# Patient Record
Sex: FEMALE | Race: WHITE | Employment: FULL TIME | URBAN - NONMETROPOLITAN AREA
[De-identification: names, ages, dates, MRNs, and addresses within clinical notes are randomized per-mention and may not be internally consistent; named-entity substitution may affect disease eponyms.]

---

## 2019-06-02 ENCOUNTER — HOSPITAL ENCOUNTER (EMERGENCY)
Age: 39
Discharge: HOME | End: 2019-06-02
Attending: FAMILY MEDICINE
Payer: COMMERCIAL

## 2019-06-02 VITALS
DIASTOLIC BLOOD PRESSURE: 78 MMHG | SYSTOLIC BLOOD PRESSURE: 160 MMHG | WEIGHT: 230.8 LBS | TEMPERATURE: 97.9 F | HEART RATE: 98 BPM | RESPIRATION RATE: 18 BRPM | BODY MASS INDEX: 36.22 KG/M2 | HEIGHT: 67 IN

## 2019-06-02 DIAGNOSIS — R42 DIZZINESS: Primary | ICD-10-CM

## 2019-06-02 PROCEDURE — 6370000000 HC RX 637 (ALT 250 FOR IP): Performed by: FAMILY MEDICINE

## 2019-06-02 PROCEDURE — 99283 EMERGENCY DEPT VISIT LOW MDM: CPT

## 2019-06-02 RX ORDER — MECLIZINE HYDROCHLORIDE 25 MG/1
25 TABLET ORAL 3 TIMES DAILY PRN
Qty: 30 TABLET | Refills: 0 | Status: SHIPPED | OUTPATIENT
Start: 2019-06-02 | End: 2019-06-12

## 2019-06-02 RX ORDER — MECLIZINE HCL 12.5 MG/1
25 TABLET ORAL ONCE
Status: COMPLETED | OUTPATIENT
Start: 2019-06-02 | End: 2019-06-02

## 2019-06-02 RX ADMIN — MECLIZINE 25 MG: 12.5 TABLET ORAL at 20:48

## 2019-06-03 NOTE — ED PROVIDER NOTES
eMERGENCY dEPARTMENT eNCOUnter        [facility]    Chief Complaint   Patient presents with    Dizziness     dizziness at work today and thought she was going to pass out. They checked her BG and it was >100. Dizziness comes and goes. Finds it happens with movement. HPI    Adia Nance is a 35 y.o. female who presents with dizziness at work today she thought she was going to pass out. Her blood sugars okay. The dizziness is been with her for 3-4 days. It happens with head movement. She did do [place] week\" in [place] with recent significant partying. She thinks this may relate. No persistent vomiting. REVIEW OF SYSTEMS    See HPI for further details. Review of systems otherwise negative. PAST MEDICAL HISTORY    History reviewed. No pertinent past medical history. SURGICAL HISTORY    Past Surgical History:   Procedure Laterality Date     SECTION         CURRENT MEDICATIONS        ALLERGIES    No Known Allergies    FAMILY HISTORY    History reviewed. No pertinent family history. SOCIAL HISTORY    Social History     Socioeconomic History    Marital status: Single     Spouse name: None    Number of children: None    Years of education: None    Highest education level: None   Occupational History    None   Social Needs    Financial resource strain: None    Food insecurity:     Worry: None     Inability: None    Transportation needs:     Medical: None     Non-medical: None   Tobacco Use    Smoking status: Current Every Day Smoker     Packs/day: 0.50     Types: Cigarettes    Smokeless tobacco: Never Used   Substance and Sexual Activity    Alcohol use:  Yes     Alcohol/week: 1.2 oz     Types: 2 Cans of beer per week    Drug use: Never    Sexual activity: None   Lifestyle    Physical activity:     Days per week: None     Minutes per session: None    Stress: None   Relationships    Social connections:     Talks on phone: None     Gets together: None     Attends Yazidism service: None     Active member of club or organization: None     Attends meetings of clubs or organizations: None     Relationship status: None    Intimate partner violence:     Fear of current or ex partner: None     Emotionally abused: None     Physically abused: None     Forced sexual activity: None   Other Topics Concern    None   Social History Narrative    None       PHYSICAL EXAM    VITAL SIGNS: Pulse 98   Temp 97.9 °F (36.6 °C) (Oral)   Resp 18   Ht 5' 7\" (1.702 m)   Wt 230 lb 12.8 oz (104.7 kg)   BMI 36.15 kg/m²    Constitutional:  Well developed, well nourished, 26-year-old female who feels dizzy with rapid head movement, no acute distress   HENT:  Atraumatic, external ears normal, nose normal, oropharynx moist, significant dental repair, tongue and uvula normal size with normal movement. Neck- supple with trachea midline. No thyroid tenderness. No JVD noted. Eyes show EOMI, PERRLA. Pupils are equal and reactive to light at 3 mm. Good red reflex bilaterally. The patient was contacts. No significant nystagmus. Respiratory: Clear lung sounds with no wheezes or rhonchi. Cardiovascular:  Regular rate and rhythm at 96/m. The PMI is in the left. No murmur or gallop rhythm is noted. GI:  Soft, nondistended, normal bowel sounds, nontender, no organomegaly, no mass, no rebound, no guarding   Musculoskeletal:  No edema, no tenderness, no deformities. Back- no tenderness. The neck shows full range of motion without restriction. Integument:  Well hydrated, no rash   Neurologic:  Alert & oriented x 3, no focal deficits noted, cranial nerves II through XII appear to be grossly intact. Midline stable with maneuvers    ED COURSE & MEDICAL DECISION MAKING    Pertinent Labs & Imaging studies reviewed. (See chart for details)    Summation      Patient Course: 35-year-old female with dizziness that started 2 days ago and was worse at work tonight.   The patient has positional type vertigo and is treated with meclizine. Exam done. History reviewed. Patient stable at discharge    ED Medications administered this visit:    Medications   meclizine (ANTIVERT) tablet 25 mg (25 mg Oral Given 6/2/19 2048)       New Prescriptions from this visit:    New Prescriptions    MECLIZINE (ANTIVERT) 25 MG TABLET    Take 1 tablet by mouth 3 times daily as needed for Dizziness       Follow-up:  Return to ER if condition worsens     Final Impression:   1.  Dizziness New Problem              (Please note that portions of this note were completed with a voice recognition program.  Efforts were made to edit the dictations but occasionally words are mis-transcribed.)       Amnada Alvarez,   06/04/19 [id number]

## 2021-01-17 ENCOUNTER — APPOINTMENT (OUTPATIENT)
Dept: GENERAL RADIOLOGY | Age: 41
DRG: 481 | End: 2021-01-17
Payer: COMMERCIAL

## 2021-01-17 ENCOUNTER — HOSPITAL ENCOUNTER (INPATIENT)
Age: 41
LOS: 3 days | Discharge: NURSING, REHAB OR LONG-TERM CARE | DRG: 481 | End: 2021-01-20
Attending: FAMILY MEDICINE | Admitting: INTERNAL MEDICINE
Payer: COMMERCIAL

## 2021-01-17 ENCOUNTER — ANESTHESIA EVENT (OUTPATIENT)
Dept: OPERATING ROOM | Age: 41
DRG: 481 | End: 2021-01-17
Payer: COMMERCIAL

## 2021-01-17 ENCOUNTER — ANESTHESIA (OUTPATIENT)
Dept: OPERATING ROOM | Age: 41
DRG: 481 | End: 2021-01-17
Payer: COMMERCIAL

## 2021-01-17 VITALS
SYSTOLIC BLOOD PRESSURE: 116 MMHG | RESPIRATION RATE: 17 BRPM | DIASTOLIC BLOOD PRESSURE: 78 MMHG | OXYGEN SATURATION: 96 %

## 2021-01-17 DIAGNOSIS — S72.001A CLOSED FRACTURE OF RIGHT HIP, INITIAL ENCOUNTER (HCC): Primary | ICD-10-CM

## 2021-01-17 DIAGNOSIS — D50.9 MICROCYTIC ANEMIA: ICD-10-CM

## 2021-01-17 DIAGNOSIS — F10.920 ACUTE ALCOHOLIC INTOXICATION WITHOUT COMPLICATION (HCC): ICD-10-CM

## 2021-01-17 LAB
ABO/RH: NORMAL
ABSOLUTE EOS #: 0 K/UL (ref 0–0.4)
ABSOLUTE IMMATURE GRANULOCYTE: ABNORMAL K/UL (ref 0–0.3)
ABSOLUTE LYMPH #: 1.6 K/UL (ref 1–4.8)
ABSOLUTE MONO #: 0.7 K/UL (ref 0–1)
ANION GAP SERPL CALCULATED.3IONS-SCNC: 13 MMOL/L (ref 9–17)
ANTIBODY SCREEN: NEGATIVE
ARM BAND NUMBER: NORMAL
BASOPHILS # BLD: 0 % (ref 0–2)
BASOPHILS ABSOLUTE: 0 K/UL (ref 0–0.2)
BILIRUBIN URINE: NEGATIVE
BLD PROD TYP BPU: NORMAL
BLD PROD TYP BPU: NORMAL
BUN BLDV-MCNC: 10 MG/DL (ref 6–20)
BUN/CREAT BLD: 14 (ref 9–20)
CALCIUM SERPL-MCNC: 9.1 MG/DL (ref 8.6–10.4)
CHLORIDE BLD-SCNC: 103 MMOL/L (ref 98–107)
CO2: 21 MMOL/L (ref 20–31)
COLOR: YELLOW
COMMENT UA: NORMAL
CREAT SERPL-MCNC: 0.69 MG/DL (ref 0.5–0.9)
CROSSMATCH RESULT: NORMAL
CROSSMATCH RESULT: NORMAL
DIFFERENTIAL TYPE: ABNORMAL
DISPENSE STATUS BLOOD BANK: NORMAL
DISPENSE STATUS BLOOD BANK: NORMAL
EKG ATRIAL RATE: 78 BPM
EKG P AXIS: 31 DEGREES
EKG P-R INTERVAL: 136 MS
EKG Q-T INTERVAL: 398 MS
EKG QRS DURATION: 88 MS
EKG QTC CALCULATION (BAZETT): 453 MS
EKG R AXIS: 59 DEGREES
EKG T AXIS: 45 DEGREES
EKG VENTRICULAR RATE: 78 BPM
EOSINOPHILS RELATIVE PERCENT: 0 % (ref 0–5)
ETHANOL PERCENT: 0.22 %
ETHANOL: 223 MG/DL
EXPIRATION DATE: NORMAL
GFR AFRICAN AMERICAN: >60 ML/MIN
GFR NON-AFRICAN AMERICAN: >60 ML/MIN
GFR SERPL CREATININE-BSD FRML MDRD: ABNORMAL ML/MIN/{1.73_M2}
GFR SERPL CREATININE-BSD FRML MDRD: ABNORMAL ML/MIN/{1.73_M2}
GLUCOSE BLD-MCNC: 116 MG/DL (ref 70–99)
GLUCOSE URINE: NEGATIVE
HCG(URINE) PREGNANCY TEST: NEGATIVE
HCT VFR BLD CALC: 27.4 % (ref 36–46)
HCT VFR BLD CALC: 31 % (ref 36–46)
HCT VFR BLD CALC: 32.1 % (ref 36–46)
HEMOGLOBIN: 10 G/DL (ref 12–16)
HEMOGLOBIN: 8.3 G/DL (ref 12–16)
HEMOGLOBIN: 9.7 G/DL (ref 12–16)
IMMATURE GRANULOCYTES: ABNORMAL %
IRON SATURATION: 4 % (ref 20–55)
IRON: 21 UG/DL (ref 37–145)
KETONES, URINE: NEGATIVE
LEUKOCYTE ESTERASE, URINE: NEGATIVE
LYMPHOCYTES # BLD: 11 % (ref 15–40)
MCH RBC QN AUTO: 19.6 PG (ref 26–34)
MCHC RBC AUTO-ENTMCNC: 30.4 G/DL (ref 31–37)
MCV RBC AUTO: 64.4 FL (ref 80–100)
MONOCYTES # BLD: 5 % (ref 4–8)
MORPHOLOGY: ABNORMAL
NITRITE, URINE: NEGATIVE
NRBC AUTOMATED: ABNORMAL PER 100 WBC
PDW BLD-RTO: 17.5 % (ref 12.1–15.2)
PH UA: 5 (ref 5–8)
PLATELET # BLD: 272 K/UL (ref 140–450)
PLATELET ESTIMATE: ABNORMAL
PMV BLD AUTO: ABNORMAL FL (ref 6–12)
POTASSIUM SERPL-SCNC: 3.9 MMOL/L (ref 3.7–5.3)
PROTEIN UA: NEGATIVE
RBC # BLD: 4.25 M/UL (ref 4–5.2)
RBC # BLD: ABNORMAL 10*6/UL
SARS-COV-2, RAPID: NOT DETECTED
SARS-COV-2: NORMAL
SARS-COV-2: NORMAL
SEG NEUTROPHILS: 84 % (ref 47–75)
SEGMENTED NEUTROPHILS ABSOLUTE COUNT: 12.3 K/UL (ref 2.5–7)
SODIUM BLD-SCNC: 137 MMOL/L (ref 135–144)
SOURCE: NORMAL
SPECIFIC GRAVITY UA: 1.02 (ref 1–1.03)
TOTAL IRON BINDING CAPACITY: 480 UG/DL (ref 250–450)
TRANSFUSION STATUS: NORMAL
TRANSFUSION STATUS: NORMAL
TURBIDITY: CLEAR
UNIT DIVISION: 0
UNIT DIVISION: 0
UNIT NUMBER: NORMAL
UNIT NUMBER: NORMAL
UNSATURATED IRON BINDING CAPACITY: 459 UG/DL (ref 112–347)
URINE HGB: NEGATIVE
UROBILINOGEN, URINE: NORMAL
WBC # BLD: 14.7 K/UL (ref 3.5–11)
WBC # BLD: ABNORMAL 10*3/UL

## 2021-01-17 PROCEDURE — 83550 IRON BINDING TEST: CPT

## 2021-01-17 PROCEDURE — 71045 X-RAY EXAM CHEST 1 VIEW: CPT

## 2021-01-17 PROCEDURE — 86900 BLOOD TYPING SEROLOGIC ABO: CPT

## 2021-01-17 PROCEDURE — 94761 N-INVAS EAR/PLS OXIMETRY MLT: CPT

## 2021-01-17 PROCEDURE — 96374 THER/PROPH/DIAG INJ IV PUSH: CPT

## 2021-01-17 PROCEDURE — 81025 URINE PREGNANCY TEST: CPT

## 2021-01-17 PROCEDURE — 86901 BLOOD TYPING SEROLOGIC RH(D): CPT

## 2021-01-17 PROCEDURE — 85014 HEMATOCRIT: CPT

## 2021-01-17 PROCEDURE — 80048 BASIC METABOLIC PNL TOTAL CA: CPT

## 2021-01-17 PROCEDURE — 51702 INSERT TEMP BLADDER CATH: CPT

## 2021-01-17 PROCEDURE — 85025 COMPLETE CBC W/AUTO DIFF WBC: CPT

## 2021-01-17 PROCEDURE — 2580000003 HC RX 258: Performed by: FAMILY MEDICINE

## 2021-01-17 PROCEDURE — 7100000001 HC PACU RECOVERY - ADDTL 15 MIN: Performed by: ORTHOPAEDIC SURGERY

## 2021-01-17 PROCEDURE — 36415 COLL VENOUS BLD VENIPUNCTURE: CPT

## 2021-01-17 PROCEDURE — 2580000003 HC RX 258: Performed by: INTERNAL MEDICINE

## 2021-01-17 PROCEDURE — 2500000003 HC RX 250 WO HCPCS: Performed by: INTERNAL MEDICINE

## 2021-01-17 PROCEDURE — 1200000000 HC SEMI PRIVATE

## 2021-01-17 PROCEDURE — 81003 URINALYSIS AUTO W/O SCOPE: CPT

## 2021-01-17 PROCEDURE — 76000 FLUOROSCOPY <1 HR PHYS/QHP: CPT

## 2021-01-17 PROCEDURE — 2709999900 HC NON-CHARGEABLE SUPPLY: Performed by: ORTHOPAEDIC SURGERY

## 2021-01-17 PROCEDURE — 3700000000 HC ANESTHESIA ATTENDED CARE: Performed by: ORTHOPAEDIC SURGERY

## 2021-01-17 PROCEDURE — 3600000014 HC SURGERY LEVEL 4 ADDTL 15MIN: Performed by: ORTHOPAEDIC SURGERY

## 2021-01-17 PROCEDURE — 93005 ELECTROCARDIOGRAM TRACING: CPT | Performed by: FAMILY MEDICINE

## 2021-01-17 PROCEDURE — 0QS606Z REPOSITION RIGHT UPPER FEMUR WITH INTRAMEDULLARY INTERNAL FIXATION DEVICE, OPEN APPROACH: ICD-10-PCS | Performed by: ORTHOPAEDIC SURGERY

## 2021-01-17 PROCEDURE — 86920 COMPATIBILITY TEST SPIN: CPT

## 2021-01-17 PROCEDURE — 36430 TRANSFUSION BLD/BLD COMPNT: CPT

## 2021-01-17 PROCEDURE — 93010 ELECTROCARDIOGRAM REPORT: CPT | Performed by: INTERNAL MEDICINE

## 2021-01-17 PROCEDURE — C1769 GUIDE WIRE: HCPCS | Performed by: ORTHOPAEDIC SURGERY

## 2021-01-17 PROCEDURE — G0480 DRUG TEST DEF 1-7 CLASSES: HCPCS

## 2021-01-17 PROCEDURE — 7100000000 HC PACU RECOVERY - FIRST 15 MIN: Performed by: ORTHOPAEDIC SURGERY

## 2021-01-17 PROCEDURE — 6360000002 HC RX W HCPCS: Performed by: INTERNAL MEDICINE

## 2021-01-17 PROCEDURE — 73502 X-RAY EXAM HIP UNI 2-3 VIEWS: CPT

## 2021-01-17 PROCEDURE — 6360000002 HC RX W HCPCS: Performed by: NURSE ANESTHETIST, CERTIFIED REGISTERED

## 2021-01-17 PROCEDURE — 3600000004 HC SURGERY LEVEL 4 BASE: Performed by: ORTHOPAEDIC SURGERY

## 2021-01-17 PROCEDURE — 2720000010 HC SURG SUPPLY STERILE: Performed by: ORTHOPAEDIC SURGERY

## 2021-01-17 PROCEDURE — 6370000000 HC RX 637 (ALT 250 FOR IP): Performed by: INTERNAL MEDICINE

## 2021-01-17 PROCEDURE — C1713 ANCHOR/SCREW BN/BN,TIS/BN: HCPCS | Performed by: ORTHOPAEDIC SURGERY

## 2021-01-17 PROCEDURE — U0002 COVID-19 LAB TEST NON-CDC: HCPCS

## 2021-01-17 PROCEDURE — 99285 EMERGENCY DEPT VISIT HI MDM: CPT

## 2021-01-17 PROCEDURE — P9016 RBC LEUKOCYTES REDUCED: HCPCS

## 2021-01-17 PROCEDURE — 6360000002 HC RX W HCPCS: Performed by: ORTHOPAEDIC SURGERY

## 2021-01-17 PROCEDURE — 6360000002 HC RX W HCPCS: Performed by: FAMILY MEDICINE

## 2021-01-17 PROCEDURE — 2580000003 HC RX 258: Performed by: NURSE ANESTHETIST, CERTIFIED REGISTERED

## 2021-01-17 PROCEDURE — 83540 ASSAY OF IRON: CPT

## 2021-01-17 PROCEDURE — 85018 HEMOGLOBIN: CPT

## 2021-01-17 PROCEDURE — 2500000003 HC RX 250 WO HCPCS: Performed by: NURSE ANESTHETIST, CERTIFIED REGISTERED

## 2021-01-17 PROCEDURE — 3700000001 HC ADD 15 MINUTES (ANESTHESIA): Performed by: ORTHOPAEDIC SURGERY

## 2021-01-17 PROCEDURE — 96375 TX/PRO/DX INJ NEW DRUG ADDON: CPT

## 2021-01-17 PROCEDURE — 86850 RBC ANTIBODY SCREEN: CPT

## 2021-01-17 PROCEDURE — 2780000010 HC IMPLANT OTHER: Performed by: ORTHOPAEDIC SURGERY

## 2021-01-17 DEVICE — IMPLANTABLE DEVICE: Type: IMPLANTABLE DEVICE | Status: FUNCTIONAL

## 2021-01-17 DEVICE — SCREW BNE L40MM DIA5MM ST TIB LT GRN TI ST CANN LOK FULL: Type: IMPLANTABLE DEVICE | Status: FUNCTIONAL

## 2021-01-17 RX ORDER — HYDROCODONE BITARTRATE AND ACETAMINOPHEN 5; 325 MG/1; MG/1
1 TABLET ORAL EVERY 4 HOURS PRN
Status: DISCONTINUED | OUTPATIENT
Start: 2021-01-17 | End: 2021-01-17

## 2021-01-17 RX ORDER — MORPHINE SULFATE 4 MG/ML
4 INJECTION, SOLUTION INTRAMUSCULAR; INTRAVENOUS
Status: DISCONTINUED | OUTPATIENT
Start: 2021-01-17 | End: 2021-01-20 | Stop reason: HOSPADM

## 2021-01-17 RX ORDER — KETOROLAC TROMETHAMINE 30 MG/ML
INJECTION, SOLUTION INTRAMUSCULAR; INTRAVENOUS PRN
Status: DISCONTINUED | OUTPATIENT
Start: 2021-01-17 | End: 2021-01-17 | Stop reason: SDUPTHER

## 2021-01-17 RX ORDER — POLYETHYLENE GLYCOL 3350 17 G/17G
17 POWDER, FOR SOLUTION ORAL DAILY PRN
Status: DISCONTINUED | OUTPATIENT
Start: 2021-01-17 | End: 2021-01-20 | Stop reason: HOSPADM

## 2021-01-17 RX ORDER — SODIUM CHLORIDE 0.9 % (FLUSH) 0.9 %
10 SYRINGE (ML) INJECTION EVERY 12 HOURS SCHEDULED
Status: DISCONTINUED | OUTPATIENT
Start: 2021-01-17 | End: 2021-01-20 | Stop reason: HOSPADM

## 2021-01-17 RX ORDER — IBUPROFEN 200 MG
800 TABLET ORAL EVERY 6 HOURS PRN
Status: ON HOLD | COMMUNITY
End: 2021-01-20 | Stop reason: HOSPADM

## 2021-01-17 RX ORDER — LIDOCAINE HYDROCHLORIDE 10 MG/ML
INJECTION, SOLUTION EPIDURAL; INFILTRATION; INTRACAUDAL; PERINEURAL PRN
Status: DISCONTINUED | OUTPATIENT
Start: 2021-01-17 | End: 2021-01-17 | Stop reason: SDUPTHER

## 2021-01-17 RX ORDER — PROMETHAZINE HYDROCHLORIDE 25 MG/1
12.5 TABLET ORAL EVERY 6 HOURS PRN
Status: DISCONTINUED | OUTPATIENT
Start: 2021-01-17 | End: 2021-01-20 | Stop reason: HOSPADM

## 2021-01-17 RX ORDER — ONDANSETRON 2 MG/ML
4 INJECTION INTRAMUSCULAR; INTRAVENOUS EVERY 6 HOURS PRN
Status: DISCONTINUED | OUTPATIENT
Start: 2021-01-17 | End: 2021-01-20 | Stop reason: HOSPADM

## 2021-01-17 RX ORDER — ACETAMINOPHEN 650 MG/1
650 SUPPOSITORY RECTAL EVERY 6 HOURS PRN
Status: DISCONTINUED | OUTPATIENT
Start: 2021-01-17 | End: 2021-01-20 | Stop reason: HOSPADM

## 2021-01-17 RX ORDER — OXYCODONE HYDROCHLORIDE 5 MG/1
5 TABLET ORAL EVERY 4 HOURS PRN
Status: DISCONTINUED | OUTPATIENT
Start: 2021-01-17 | End: 2021-01-20 | Stop reason: HOSPADM

## 2021-01-17 RX ORDER — OXYCODONE HYDROCHLORIDE 5 MG/1
10 TABLET ORAL EVERY 4 HOURS PRN
Status: DISCONTINUED | OUTPATIENT
Start: 2021-01-17 | End: 2021-01-20 | Stop reason: HOSPADM

## 2021-01-17 RX ORDER — SENNA AND DOCUSATE SODIUM 50; 8.6 MG/1; MG/1
1 TABLET, FILM COATED ORAL 2 TIMES DAILY
Status: DISCONTINUED | OUTPATIENT
Start: 2021-01-17 | End: 2021-01-20 | Stop reason: HOSPADM

## 2021-01-17 RX ORDER — LANOLIN ALCOHOL/MO/W.PET/CERES
100 CREAM (GRAM) TOPICAL DAILY
Status: DISCONTINUED | OUTPATIENT
Start: 2021-01-17 | End: 2021-01-17 | Stop reason: CLARIF

## 2021-01-17 RX ORDER — SODIUM CHLORIDE 0.9 % (FLUSH) 0.9 %
10 SYRINGE (ML) INJECTION PRN
Status: DISCONTINUED | OUTPATIENT
Start: 2021-01-17 | End: 2021-01-20 | Stop reason: HOSPADM

## 2021-01-17 RX ORDER — MIDAZOLAM HYDROCHLORIDE 2 MG/2ML
INJECTION, SOLUTION INTRAMUSCULAR; INTRAVENOUS PRN
Status: DISCONTINUED | OUTPATIENT
Start: 2021-01-17 | End: 2021-01-17 | Stop reason: SDUPTHER

## 2021-01-17 RX ORDER — PANTOPRAZOLE SODIUM 40 MG/1
40 TABLET, DELAYED RELEASE ORAL
Status: DISCONTINUED | OUTPATIENT
Start: 2021-01-17 | End: 2021-01-20 | Stop reason: HOSPADM

## 2021-01-17 RX ORDER — FENTANYL CITRATE 50 UG/ML
50 INJECTION, SOLUTION INTRAMUSCULAR; INTRAVENOUS ONCE
Status: COMPLETED | OUTPATIENT
Start: 2021-01-17 | End: 2021-01-17

## 2021-01-17 RX ORDER — CEFAZOLIN SODIUM 2 G/50ML
2 SOLUTION INTRAVENOUS
Status: COMPLETED | OUTPATIENT
Start: 2021-01-17 | End: 2021-01-18

## 2021-01-17 RX ORDER — PROPOFOL 10 MG/ML
INJECTION, EMULSION INTRAVENOUS PRN
Status: DISCONTINUED | OUTPATIENT
Start: 2021-01-17 | End: 2021-01-17 | Stop reason: SDUPTHER

## 2021-01-17 RX ORDER — ONDANSETRON 2 MG/ML
INJECTION INTRAMUSCULAR; INTRAVENOUS PRN
Status: DISCONTINUED | OUTPATIENT
Start: 2021-01-17 | End: 2021-01-17 | Stop reason: SDUPTHER

## 2021-01-17 RX ORDER — ONDANSETRON 2 MG/ML
4 INJECTION INTRAMUSCULAR; INTRAVENOUS ONCE
Status: COMPLETED | OUTPATIENT
Start: 2021-01-17 | End: 2021-01-17

## 2021-01-17 RX ORDER — SODIUM CHLORIDE 9 MG/ML
INJECTION, SOLUTION INTRAVENOUS PRN
Status: COMPLETED | OUTPATIENT
Start: 2021-01-17 | End: 2021-01-17

## 2021-01-17 RX ORDER — BUPIVACAINE HYDROCHLORIDE AND EPINEPHRINE 5; 5 MG/ML; UG/ML
INJECTION, SOLUTION PERINEURAL PRN
Status: DISCONTINUED | OUTPATIENT
Start: 2021-01-17 | End: 2021-01-17 | Stop reason: HOSPADM

## 2021-01-17 RX ORDER — DEXAMETHASONE SODIUM PHOSPHATE 10 MG/ML
INJECTION, SOLUTION INTRAMUSCULAR; INTRAVENOUS PRN
Status: DISCONTINUED | OUTPATIENT
Start: 2021-01-17 | End: 2021-01-17 | Stop reason: SDUPTHER

## 2021-01-17 RX ORDER — SODIUM CHLORIDE 9 MG/ML
INJECTION, SOLUTION INTRAVENOUS CONTINUOUS
Status: DISCONTINUED | OUTPATIENT
Start: 2021-01-17 | End: 2021-01-18

## 2021-01-17 RX ORDER — FOLIC ACID 1 MG/1
1 TABLET ORAL DAILY
Status: DISCONTINUED | OUTPATIENT
Start: 2021-01-17 | End: 2021-01-20 | Stop reason: HOSPADM

## 2021-01-17 RX ORDER — GAUZE BANDAGE 2" X 2"
100 BANDAGE TOPICAL DAILY
Status: DISCONTINUED | OUTPATIENT
Start: 2021-01-17 | End: 2021-01-20 | Stop reason: HOSPADM

## 2021-01-17 RX ORDER — SODIUM CHLORIDE 9 MG/ML
INJECTION, SOLUTION INTRAVENOUS CONTINUOUS
Status: DISCONTINUED | OUTPATIENT
Start: 2021-01-17 | End: 2021-01-17

## 2021-01-17 RX ORDER — FENTANYL CITRATE 50 UG/ML
INJECTION, SOLUTION INTRAMUSCULAR; INTRAVENOUS PRN
Status: DISCONTINUED | OUTPATIENT
Start: 2021-01-17 | End: 2021-01-17 | Stop reason: SDUPTHER

## 2021-01-17 RX ORDER — SODIUM CHLORIDE, SODIUM LACTATE, POTASSIUM CHLORIDE, CALCIUM CHLORIDE 600; 310; 30; 20 MG/100ML; MG/100ML; MG/100ML; MG/100ML
INJECTION, SOLUTION INTRAVENOUS CONTINUOUS PRN
Status: DISCONTINUED | OUTPATIENT
Start: 2021-01-17 | End: 2021-01-17 | Stop reason: SDUPTHER

## 2021-01-17 RX ORDER — ACETAMINOPHEN 325 MG/1
650 TABLET ORAL EVERY 6 HOURS PRN
Status: DISCONTINUED | OUTPATIENT
Start: 2021-01-17 | End: 2021-01-20 | Stop reason: HOSPADM

## 2021-01-17 RX ORDER — HYDROMORPHONE HCL 110MG/55ML
PATIENT CONTROLLED ANALGESIA SYRINGE INTRAVENOUS PRN
Status: DISCONTINUED | OUTPATIENT
Start: 2021-01-17 | End: 2021-01-17 | Stop reason: SDUPTHER

## 2021-01-17 RX ADMIN — LIDOCAINE HYDROCHLORIDE 100 MG: 10 INJECTION, SOLUTION EPIDURAL; INFILTRATION; INTRACAUDAL; PERINEURAL at 18:22

## 2021-01-17 RX ADMIN — HYDROCODONE BITARTRATE AND ACETAMINOPHEN 1 TABLET: 5; 325 TABLET ORAL at 12:04

## 2021-01-17 RX ADMIN — HYDROCODONE BITARTRATE AND ACETAMINOPHEN 1 TABLET: 5; 325 TABLET ORAL at 07:43

## 2021-01-17 RX ADMIN — FENTANYL CITRATE 25 MCG: 50 INJECTION, SOLUTION INTRAMUSCULAR; INTRAVENOUS at 20:30

## 2021-01-17 RX ADMIN — MORPHINE SULFATE 4 MG: 4 INJECTION, SOLUTION INTRAMUSCULAR; INTRAVENOUS at 10:43

## 2021-01-17 RX ADMIN — MORPHINE SULFATE 4 MG: 4 INJECTION, SOLUTION INTRAMUSCULAR; INTRAVENOUS at 07:42

## 2021-01-17 RX ADMIN — FENTANYL CITRATE 25 MCG: 50 INJECTION, SOLUTION INTRAMUSCULAR; INTRAVENOUS at 19:50

## 2021-01-17 RX ADMIN — MORPHINE SULFATE 4 MG: 4 INJECTION, SOLUTION INTRAMUSCULAR; INTRAVENOUS at 14:53

## 2021-01-17 RX ADMIN — SODIUM CHLORIDE: 9 INJECTION, SOLUTION INTRAVENOUS at 16:51

## 2021-01-17 RX ADMIN — MORPHINE SULFATE 4 MG: 4 INJECTION, SOLUTION INTRAMUSCULAR; INTRAVENOUS at 04:41

## 2021-01-17 RX ADMIN — ONDANSETRON 4 MG: 2 INJECTION, SOLUTION INTRAMUSCULAR; INTRAVENOUS at 01:59

## 2021-01-17 RX ADMIN — FENTANYL CITRATE 25 MCG: 50 INJECTION, SOLUTION INTRAMUSCULAR; INTRAVENOUS at 19:15

## 2021-01-17 RX ADMIN — FENTANYL CITRATE 25 MCG: 50 INJECTION, SOLUTION INTRAMUSCULAR; INTRAVENOUS at 19:17

## 2021-01-17 RX ADMIN — ONDANSETRON 4 MG: 2 INJECTION INTRAMUSCULAR; INTRAVENOUS at 20:32

## 2021-01-17 RX ADMIN — SODIUM CHLORIDE: 9 INJECTION, SOLUTION INTRAVENOUS at 08:45

## 2021-01-17 RX ADMIN — FENTANYL CITRATE 25 MCG: 50 INJECTION, SOLUTION INTRAMUSCULAR; INTRAVENOUS at 20:40

## 2021-01-17 RX ADMIN — Medication 10 ML: at 07:45

## 2021-01-17 RX ADMIN — Medication 10 ML: at 14:54

## 2021-01-17 RX ADMIN — FENTANYL CITRATE 50 MCG: 50 INJECTION, SOLUTION INTRAMUSCULAR; INTRAVENOUS at 18:22

## 2021-01-17 RX ADMIN — SODIUM CHLORIDE: 9 INJECTION, SOLUTION INTRAVENOUS at 04:41

## 2021-01-17 RX ADMIN — CEFAZOLIN SODIUM 2 G: 2 SOLUTION INTRAVENOUS at 17:50

## 2021-01-17 RX ADMIN — FENTANYL CITRATE 50 MCG: 50 INJECTION, SOLUTION INTRAMUSCULAR; INTRAVENOUS at 01:59

## 2021-01-17 RX ADMIN — HYDROMORPHONE HYDROCHLORIDE 1 MG: 2 INJECTION, SOLUTION INTRAMUSCULAR; INTRAVENOUS; SUBCUTANEOUS at 21:35

## 2021-01-17 RX ADMIN — FENTANYL CITRATE 25 MCG: 50 INJECTION, SOLUTION INTRAMUSCULAR; INTRAVENOUS at 19:37

## 2021-01-17 RX ADMIN — FOLIC ACID 1 MG: 1 TABLET ORAL at 12:04

## 2021-01-17 RX ADMIN — HYDROMORPHONE HYDROCHLORIDE 1 MG: 2 INJECTION, SOLUTION INTRAMUSCULAR; INTRAVENOUS; SUBCUTANEOUS at 21:41

## 2021-01-17 RX ADMIN — PROPOFOL 200 MG: 10 INJECTION, EMULSION INTRAVENOUS at 18:22

## 2021-01-17 RX ADMIN — SODIUM CHLORIDE, POTASSIUM CHLORIDE, SODIUM LACTATE AND CALCIUM CHLORIDE: 600; 310; 30; 20 INJECTION, SOLUTION INTRAVENOUS at 20:13

## 2021-01-17 RX ADMIN — FENTANYL CITRATE 25 MCG: 50 INJECTION, SOLUTION INTRAMUSCULAR; INTRAVENOUS at 20:48

## 2021-01-17 RX ADMIN — FENTANYL CITRATE 25 MCG: 50 INJECTION, SOLUTION INTRAMUSCULAR; INTRAVENOUS at 20:02

## 2021-01-17 RX ADMIN — FENTANYL CITRATE 25 MCG: 50 INJECTION, SOLUTION INTRAMUSCULAR; INTRAVENOUS at 20:15

## 2021-01-17 RX ADMIN — THIAMINE HCL TAB 100 MG 100 MG: 100 TAB at 12:04

## 2021-01-17 RX ADMIN — DEXAMETHASONE SODIUM PHOSPHATE 10 MG: 10 INJECTION, SOLUTION INTRAMUSCULAR; INTRAVENOUS at 18:51

## 2021-01-17 RX ADMIN — KETOROLAC TROMETHAMINE 30 MG: 30 INJECTION, SOLUTION INTRAMUSCULAR at 20:31

## 2021-01-17 RX ADMIN — MIDAZOLAM HYDROCHLORIDE 2 MG: 1 INJECTION, SOLUTION INTRAMUSCULAR; INTRAVENOUS at 18:15

## 2021-01-17 RX ADMIN — SODIUM CHLORIDE: 9 INJECTION, SOLUTION INTRAVENOUS at 02:00

## 2021-01-17 RX ADMIN — FENTANYL CITRATE 25 MCG: 50 INJECTION, SOLUTION INTRAMUSCULAR; INTRAVENOUS at 18:15

## 2021-01-17 RX ADMIN — FOLIC ACID: 5 INJECTION, SOLUTION INTRAMUSCULAR; INTRAVENOUS; SUBCUTANEOUS at 06:46

## 2021-01-17 ASSESSMENT — PAIN SCALES - GENERAL
PAINLEVEL_OUTOF10: 8
PAINLEVEL_OUTOF10: 9

## 2021-01-17 ASSESSMENT — ENCOUNTER SYMPTOMS
RESPIRATORY NEGATIVE: 1
GASTROINTESTINAL NEGATIVE: 1

## 2021-01-17 ASSESSMENT — PAIN DESCRIPTION - LOCATION
LOCATION: HIP
LOCATION: HIP

## 2021-01-17 NOTE — ED PROVIDER NOTES
T[address] 57      Pt Name: Pawel Sam  MRN: [id number]  [place] 1980  Date of evaluation: 2021  Provider: Suhas Felix MD    CHIEF COMPLAINT       Chief Complaint   Patient presents with    Hip Pain     pt states that she drank 10-12 beers and some shots of alcohol, and fell while walking to the bathroom injurying her right hip.  Fall         HISTORY OF PRESENT ILLNESS   (Location/Symptom, Timing/Onset, Context/Setting, Quality, Duration, Modifying Factors, Severity)  Note limiting factors. Pawel Sam is a 42 y.o. female who presents to the emergency department today is the patient's birthday, she was drinking rather heavily this afternoon she was walking to the bathroom, slipped on some water and fell, injured her right hip and was unable to walk thereafter. Leg is shortened and externally rotated. Squad was called and she was transported here in the emergency department, she denies any other injury she did not hit her head    HPI    Nursing Notes were reviewed. REVIEW OF SYSTEMS    (2-9 systems for level 4, 10 or more for level 5)     Review of Systems   Constitutional: Negative. HENT: Negative. Respiratory: Negative. Cardiovascular: Negative. Gastrointestinal: Negative. Musculoskeletal:        Right hip pain   All other systems reviewed and are negative. Except as noted above the remainder of the review of systems was reviewed and negative. PAST MEDICAL HISTORY   History reviewed. No pertinent past medical history. SURGICAL HISTORY       Past Surgical History:   Procedure Laterality Date     SECTION           CURRENT MEDICATIONS       Current Discharge Medication List      CONTINUE these medications which have NOT CHANGED    Details   ibuprofen (ADVIL;MOTRIN) 200 MG tablet Take 800 mg by mouth every 6 hours as needed for Pain             ALLERGIES     Patient has no known allergies.     FAMILY HISTORY History reviewed. No pertinent family history. SOCIAL HISTORY       Social History     Socioeconomic History    Marital status: Single     Spouse name: None    Number of children: None    Years of education: None    Highest education level: None   Occupational History    None   Social Needs    Financial resource strain: None    Food insecurity     Worry: None     Inability: None    Transportation needs     Medical: None     Non-medical: None   Tobacco Use    Smoking status: Current Every Day Smoker     Packs/day: 0.50     Types: Cigarettes    Smokeless tobacco: Never Used   Substance and Sexual Activity    Alcohol use: Yes     Alcohol/week: 2.0 standard drinks     Types: 2 Cans of beer per week    Drug use: Never    Sexual activity: None   Lifestyle    Physical activity     Days per week: None     Minutes per session: None    Stress: None   Relationships    Social connections     Talks on phone: None     Gets together: None     Attends Anglican service: None     Active member of club or organization: None     Attends meetings of clubs or organizations: None     Relationship status: None    Intimate partner violence     Fear of current or ex partner: None     Emotionally abused: None     Physically abused: None     Forced sexual activity: None   Other Topics Concern    None   Social History Narrative    None       SCREENINGS        [place] Coma Scale  Eye Opening: Spontaneous  Best Verbal Response: Oriented  Best Motor Response: Obeys commands  [place] Coma Scale Score: 15               PHYSICAL EXAM    (up to 7 for level 4, 8 or more for level 5)     ED Triage Vitals [01/17/21 0147]   BP Temp Temp Source Pulse Resp SpO2 Height Weight   129/80 97.9 °F (36.6 °C) Oral 88 20 100 % -- 257 lb 1.6 oz (116.6 kg)       Physical Exam  Vitals signs reviewed. Constitutional:       Appearance: She is not ill-appearing. HENT:      Head: Atraumatic. Nose: No congestion.       Mouth/Throat: Mouth: Mucous membranes are moist.      Pharynx: No posterior oropharyngeal erythema. Eyes:      General:         Right eye: No discharge. Left eye: No discharge. Pupils: Pupils are equal, round, and reactive to light. Neck:      Musculoskeletal: Neck supple. No muscular tenderness. Cardiovascular:      Rate and Rhythm: Normal rate and regular rhythm. Heart sounds: Normal heart sounds. No murmur. Pulmonary:      Effort: Pulmonary effort is normal. No respiratory distress. Breath sounds: Normal breath sounds. Abdominal:      General: Abdomen is flat. Palpations: Abdomen is soft. Tenderness: There is no abdominal tenderness. Musculoskeletal:         General: Tenderness, deformity and signs of injury present. No swelling. Comments: Right lower extremity is shortened and externally rotated, any rotation of the hip itself causes significant discomfort right lower extremity is neurovascular intact   Skin:     General: Skin is warm. Capillary Refill: Capillary refill takes less than 2 seconds. Findings: No lesion or rash. Neurological:      General: No focal deficit present. Mental Status: She is alert and oriented to person, place, and time. Cranial Nerves: No cranial nerve deficit. Motor: No weakness.    Psychiatric:         Mood and Affect: Mood normal.         Behavior: Behavior normal.         DIAGNOSTIC RESULTS     EKG: All EKG's are interpreted by the Emergency Department Physician who either signs or Co-signs this chart in the absence of a cardiologist.    Lead ECG shows sinus rhythm 78 bpm normal axis normal intervals no acute ST segment or T wave abnormalities are noted    RADIOLOGY:   Non-plain film images such as CT, Ultrasound and MRI are read by the radiologist. Plain radiographic images are visualized and preliminarily interpreted by the emergency physician with the below findings:    Portable chest x-ray shows poor inspiration lung fields appear clear without any dental viable infiltrate  Interpretation per the Radiologist below, if available at the time of this note:    XR CHEST PORTABLE   Final Result      Low lung volumes with prominence of overlying soft tissues. There is hazy    opacification within the left lung base, and while most likely due to a    component of the prominence of overlying soft tissues, an area of atelectasis    or infiltrate may be present. Consider repeat radiographs with PA and lateral    chest radiographs. XR HIP 2-3 VW W PELVIS RIGHT   Final Result   Displaced angulated intertrochanteric right hip fracture. ED BEDSIDE ULTRASOUND:   Performed by ED Physician - none    LABS:  Labs Reviewed   CBC WITH AUTO DIFFERENTIAL - Abnormal; Notable for the following components:       Result Value    WBC 14.7 (*)     Hemoglobin 8.3 (*)     Hematocrit 27.4 (*)     MCV 64.4 (*)     MCH 19.6 (*)     MCHC 30.4 (*)     RDW 17.5 (*)     Seg Neutrophils 84 (*)     Lymphocytes 11 (*)     Segs Absolute 12.30 (*)     All other components within normal limits   BASIC METABOLIC PANEL W/ REFLEX TO MG FOR LOW K - Abnormal; Notable for the following components:    Glucose 116 (*)     All other components within normal limits   ETHANOL - Abnormal; Notable for the following components:    Ethanol 223 (*)     All other components within normal limits   COVID-19   URINALYSIS   IRON AND TIBC   CBC WITH AUTO DIFFERENTIAL   TYPE AND SCREEN   PREPARE RBC (CROSSMATCH)       All other labs were within normal range or not returned as of this dictation.     EMERGENCY DEPARTMENT COURSE and DIFFERENTIAL DIAGNOSIS/MDM:   Vitals:    Vitals:    01/17/21 0602 01/17/21 0615 01/17/21 0630 01/17/21 0700   BP: (!) 140/82 138/82 139/86 (!) 143/87   Pulse: 83 85 82 85   Resp: 18 18 18 18   Temp: 97.5 °F (36.4 °C) 98 °F (36.7 °C) 98 °F (36.7 °C) 97.9 °F (36.6 °C)   TempSrc: Oral Oral Oral Oral   SpO2: 96% 95% 95% 97%   Weight: Height:               MDM      REASSESSMENT          CRITICAL CARE TIME       CONSULTS:  IP CONSULT TO ORTHOPEDIC SURGERY    PROCEDURES:  Unless otherwise noted below, none     Procedures      FINAL IMPRESSION      1. Closed fracture of right hip, initial encounter ([facility] [address]    2. Acute alcoholic intoxication without complication ([facility] [address]    3. Microcytic anemia          DISPOSITION/PLAN   DISPOSITION Admitted 01/17/2021 03:56:52 AM      PATIENT REFERRED TO:  No follow-up provider specified. DISCHARGE MEDICATIONS:  Current Discharge Medication List        Controlled Substances Monitoring:     No flowsheet data found.     (Please note that portions of this note were completed with a voice recognition program.  Efforts were made to edit the dictations but occasionally words are mis-transcribed.)    Courtney Ruiz MD (electronically signed)  Attending Emergency Physician            Courtney Ruiz MD  01/17/21 [id number]

## 2021-01-17 NOTE — PROGRESS NOTES
Blood transfusion currently being administered. This RN stays at pt bedside. No s/s of rxn. Will continue to monitor.

## 2021-01-17 NOTE — PROGRESS NOTES
Orthopedic Consult    Requesting Physician: Dr. Asia Monroy MD    CHIEF COMPLAINT:  Right hip pain    HISTORY OF PRESENT ILLNESS:      The patient is a 42 y.o. female  who presents with right hip pain from a fall early this morning. She states that she was celebrating her birthday with some friends and drank 10-12 beers with some liquor shots and as she was walking to the bathroom she slipped falling directly onto her right hip. She developed immediate pain and inability to bear weight. She was taken to the emergency department by EMS and x-rays confirmed a femoral fracture. Dr. Helen Brock was consulted for orthopaedic surgical intervention. PastMedical History:    History reviewed. No pertinent past medical history.     Past Surgical History:  Past Surgical History:   Procedure Laterality Date     SECTION         Medications Prior to Admission:   Current Facility-Administered Medications   Medication Dose Route Frequency Provider Last Rate Last Admin    0.9 % sodium chloride infusion   Intravenous Continuous Estuardo Lima  mL/hr at 21 0200 New Bag at 21 0200    morphine sulfate (PF) injection 4 mg  4 mg Intravenous Q3H PRN Gilda Harrison MD   4 mg at 21 1453    HYDROcodone-acetaminophen (NORCO) 5-325 MG per tablet 1 tablet  1 tablet Oral Q4H PRN Gilda Harrison MD   1 tablet at 21 1204    sodium chloride flush 0.9 % injection 10 mL  10 mL Intravenous 2 times per day Gilda Harrison MD   10 mL at 21 0745    sodium chloride flush 0.9 % injection 10 mL  10 mL Intravenous PRN Gilda Harrison MD   10 mL at 21 1454    [START ON 2021] enoxaparin (LOVENOX) injection 40 mg  40 mg Subcutaneous Daily Gilda Harrison MD        promethazine (PHENERGAN) tablet 12.5 mg  12.5 mg Oral Q6H PRN Gilda Harrison MD        Or    ondansetron (ZOFRAN) injection 4 mg  4 mg Intravenous Q6H PRN Gilda Harrison MD        polyethylene glycol (GLYCOLAX) packet 17 g 17 g Oral Daily PRN Lisa Blum MD        acetaminophen (TYLENOL) tablet 650 mg  650 mg Oral Q6H PRN Lisa Blum MD        Or    acetaminophen (TYLENOL) suppository 650 mg  650 mg Rectal Q6H PRN Lisa Blum MD        0.9 % sodium chloride infusion   Intravenous Continuous Lisa Blum MD 75 mL/hr at 01/17/21 0441 New Bag at 01/17/21 0441    pantoprazole (PROTONIX) tablet 40 mg  40 mg Oral QAM AC Lisa Blum MD        folic acid (FOLVITE) tablet 1 mg  1 mg Oral Daily Lisa Blum MD   1 mg at 01/17/21 1204    thiamine mononitrate tablet 100 mg  100 mg Oral Daily Lisa Blum MD   100 mg at 01/17/21 1204    ceFAZolin (ANCEF) 2 g in dextrose 3 % 50 mL IVPB (duplex)  2 g Intravenous On Call to 309 Stormy Moraes MD             Allergies:  Patient has no known allergies. Social History:   Social History     Tobacco Use   Smoking Status Current Every Day Smoker    Packs/day: 0.50    Types: Cigarettes   Smokeless Tobacco Never Used     Social History     Substance and Sexual Activity   Alcohol Use Yes    Alcohol/week: 2.0 standard drinks    Types: 2 Cans of beer per week     Social History     Substance and Sexual Activity   Drug Use Never       Family History:  History reviewed. No pertinent family history. REVIEW OF SYSTEMS:  Review of Systems reviewed and otherwise unremarkable with exception of the history of present illness.         PHYSICAL EXAM:  Patient Vitals for the past 24 hrs:   BP Temp Temp src Pulse Resp SpO2 Height Weight   01/17/21 1151 -- -- -- -- 16 96 % -- --   01/17/21 1145 (!) 157/86 98.2 °F (36.8 °C) Oral 88 18 -- -- --   01/17/21 1045 (!) 150/83 98 °F (36.7 °C) Oral 87 16 -- -- --   01/17/21 1015 (!) 148/83 98.1 °F (36.7 °C) Oral 99 16 -- -- --   01/17/21 0945 (!) 150/78 98.2 °F (36.8 °C) Oral 84 16 95 % -- --   01/17/21 0915 (!) 154/82 98.1 °F (36.7 °C) Oral 86 16 95 % -- --   01/17/21 0900 (!) 151/88 98 °F (36.7 °C) Oral 87 16 95 % -- -- 137 01/17/2021    K 3.9 01/17/2021     01/17/2021    CO2 21 01/17/2021    BUN 10 01/17/2021    CREATININE 0.69 01/17/2021    CALCIUM 9.1 01/17/2021    GLUCOSE 116 01/17/2021     PT/INR:  No results found for: PROTIME, INR      Radiology:   Xr Hip 2-3 Vw W Pelvis Right     Result Date: 1/17/2021  EXAMINATION: XR HIP 2-3 VW W PELVIS RIGHT HISTORY: Fall, right hip pain. COMPARISON: None. FINDINGS: There is a displaced intertrochanteric right hip fracture, with superior displacement. The femoral head is well seated in the acetabulum. There appears to be basi cervical component as well.     Displaced angulated intertrochanteric right hip fracture. ASSESSMENT:Principal Problem:    Closed fracture of right hip (HCC)  Active Problems:    Closed hip fracture, right, initial encounter [facility])  Resolved Problems:    * No resolved hospital problems. *       PLAN:  1) Labs and vitals reviewed. The patient has received 2 units PRBC's for low hemoglobin of unknown etiology. I have discussed treatments and alternatives with the patient. I have recommended surgical intervention involving a right femur IM nail. I explained the procedure to her at length. All risks, benefits, and complications were given. All questions were answered to her satisfaction. Informed consent was obtained and surgery will take place this evening. Yoselyn Card      Injury discussed with the patient. Have discussed risks associated with this injury including the possibility of AVN and failure of fixation. Have discussed the possible need for a total hip arthroplasty in the near future if fracture does not heal or develops AVN. Patient understands risks associated with surgery and has elected to proceed.     Jeferson Erps  6:17 PM  01/17/21

## 2021-01-17 NOTE — H&P
and the patient was admitted for surgical repair of her hip. As mentioned above, she had low H&H however she reports no history of known anemia in the past.  She reports no history of GI problems, especially ulcers. She states that her.'s are usually 3 to 5 days, sometimes heavy but most of the time average. There is no family history of GI malignancy. She reports no history of black stools, blood in stool. Past Medical History:  History reviewed. No pertinent past medical history. Past Surgical History:        Procedure Laterality Date     SECTION         Home Medications:   No current facility-administered medications on file prior to encounter. Current Outpatient Medications on File Prior to Encounter   Medication Sig Dispense Refill    ibuprofen (ADVIL;MOTRIN) 200 MG tablet Take 800 mg by mouth every 6 hours as needed for Pain         Allergies:  Patient has no known allergies. Social History:    reports that she has been smoking cigarettes. She has been smoking about 0.50 packs per day. She has never used smokeless tobacco. She reports current alcohol use of about 2.0 standard drinks of alcohol per week. She reports that she does not use drugs. Family History:   Reports that her father has leukemia, mother is healthy. No family history of heart disease        Review of systems:  Constitutional: no fever, no night sweats, no fatigue  Head: no headache, no head injury, no migranes. Eye: no blurring of vision, no double vision.   Ears: no hearing difficulty, no tinnitus  Mouth/throat: no sore throat, no dysphagia  Lungs: no cough, no shortness of breath, no wheeze  CVS: no palpitation, no chest pain, no shortness of breath  GI: no abdominal pain, no nausea , no vomiting, no constipation  SHEREE: no dysuria, frequency and urgency, no hematuria, no kidney stones  Musculoskeletal: Positive for pain in the right hip  Endocrine: no polyuria, polydypsia, no cold or heat intolerence  Hematology: no anemia, no easy brusing or bleeding, no hx of clotting disorder  Dermatology: no skin rash, no eczema, no prurities,  Psychiatry: no depression, no anxiety,no panic attacks, no suicide ideation  Neurology: no syncope, no seizures, no numbness or tingling of hands, no numbness or tingling of feet, no paresis    e. Vitals:   Vitals:    01/17/21 1015   BP: (!) 148/83   Pulse: 99   Resp: 16   Temp: 98.1 °F (36.7 °C)   SpO2:       BMI: Body mass index is 40.83 kg/m².     Physical Exam:  General Appearance: alert and oriented to person, place and time, in no acute distress  Cardiovascular: normal rate, regular rhythm, normal S1 and S2, no murmurs, rubs, clicks, or gallops, distal pulses intact  Pulmonary/Chest: clear to auscultation bilaterally- no wheezes, rales or rhonchi, normal air movement, no respiratory distress  Abdomen: soft, non-tender, non-distended, normal bowel sounds, no masses   Extremities: no cyanosis, clubbing or edema, pulse   Skin: warm and dry, no rash or erythema  Head: normocephalic and atraumatic  Eyes: pupils equal, round, and reactive to light  Neck: supple and non-tender without mass, no thyromegaly   Musculoskeletal: Tenderness in the right hip area, external rotation of the right lower extremity noted  Neurological: alert, oriented, normal speech, no focal findings or movement disorder noted    Review of Labs and Diagnostic Testing:    Recent Results (from the past 24 hour(s))   CBC Auto Differential    Collection Time: 01/17/21  1:58 AM   Result Value Ref Range    WBC 14.7 (H) 3.5 - 11.0 k/uL    RBC 4.25 4.0 - 5.2 m/uL    Hemoglobin 8.3 (L) 12.0 - 16.0 g/dL    Hematocrit 27.4 (L) 36 - 46 %    MCV 64.4 (L) 80 - 100 fL    MCH 19.6 (L) 26 - 34 pg    MCHC 30.4 (L) 31 - 37 g/dL    RDW 17.5 (H) 12.1 - 15.2 %    Platelets 075 233 - 298 k/uL    MPV NOT REPORTED 6.0 - 12.0 fL    NRBC Automated NOT REPORTED per 100 WBC    Differential Type NOT REPORTED     Immature Granulocytes NOT REPORTED 0 %    Absolute Immature Granulocyte NOT REPORTED 0.00 - 0.30 k/uL    WBC Morphology NOT REPORTED     RBC Morphology NOT REPORTED     Platelet Estimate NOT REPORTED     Seg Neutrophils 84 (H) 47 - 75 %    Lymphocytes 11 (L) 15 - 40 %    Monocytes 5 4 - 8 %    Eosinophils % 0 0 - 5 %    Basophils 0 0 - 2 %    Segs Absolute 12.30 (H) 2.5 - 7.0 k/uL    Absolute Lymph # 1.60 1.0 - 4.8 k/uL    Absolute Mono # 0.70 0.0 - 1.0 k/uL    Absolute Eos # 0.00 0.0 - 0.4 k/uL    Basophils Absolute 0.00 0.0 - 0.2 k/uL    Morphology MODERATE MICROCYTOSIS    Basic Metabolic Panel w/ Reflex to MG    Collection Time: 01/17/21  1:58 AM   Result Value Ref Range    Glucose 116 (H) 70 - 99 mg/dL    BUN 10 6 - 20 mg/dL    CREATININE 0.69 0.50 - 0.90 mg/dL    Bun/Cre Ratio 14 9 - 20    Calcium 9.1 8.6 - 10.4 mg/dL    Sodium 137 135 - 144 mmol/L    Potassium 3.9 3.7 - 5.3 mmol/L    Chloride 103 98 - 107 mmol/L    CO2 21 20 - 31 mmol/L    Anion Gap 13 9 - 17 mmol/L    GFR Non-African American >60 >60 mL/min    GFR African American >60 >60 mL/min    GFR Comment          GFR Staging NOT REPORTED    Ethanol    Collection Time: 01/17/21  1:59 AM   Result Value Ref Range    Ethanol 223 (H) <10 mg/dL    Ethanol percent 0.223 %   TYPE AND SCREEN    Collection Time: 01/17/21  2:00 AM   Result Value Ref Range    Expiration Date 01/20/2021,2359     Arm Band Number NOT REPORTED     ABO/Rh B POSITIVE     Antibody Screen NEGATIVE     Unit Number [id number]     Product Code Leukocyte Reduced Red Cell     Unit Divison 00     Dispense Status ISSUED     Transfusion Status OK TO TRANSFUSE     Crossmatch Result COMPATIBLE     Unit Number [id number]     Product Code Leukocyte Reduced Red Cell     Unit Divison 00     Dispense Status ISSUED     Transfusion Status OK TO TRANSFUSE     Crossmatch Result COMPATIBLE    COVID-19, PCR    Collection Time: 01/17/21  2:16 AM    Specimen: Other   Result Value Ref Range    SARS-CoV-2 may be present. Consider repeat radiographs with PA and lateral chest radiographs. Xr Hip 2-3 Vw W Pelvis Right    Result Date: 1/17/2021  EXAMINATION: XR HIP 2-3 VW W PELVIS RIGHT HISTORY: Fall, right hip pain. COMPARISON: None. FINDINGS: There is a displaced intertrochanteric right hip fracture, with superior displacement. The femoral head is well seated in the acetabulum. Displaced angulated intertrochanteric right hip fracture. EKG: Normal sinus rhythm, rate of 78, no acute changes, essentially normal EKG    Assessment/ Plan:     1. Closed hip fracture, right, initial encounter  - consult orthopedic surgeon for hip repair, her pain is managed with IV morphine and prn Norco.  Pleitez catheter placed. DVT prophylaxis with SCDs and Lovenox after surgery. 2.  Microcytic anemia of unknown chronicity - we will check iron studies, stool occult. She will need outpatient work-up in the future. Patient receiving 2 units of PRBC prior to surgery. Follow-up with H&H later today. 3.  Alcohol intoxication -patient was treated with banana bag. She reports drinking 2-3 beers per day. Will watch for possible alcohol withdrawal.  Start on thiamine and  folic acid          Medical Necessity: Inpatient admission is appropriate for this patient secondary to the need of surgical repair of right hip fracture    Estimated length of stay: 2  days. The beneficiary may reasonably be expected to be discharged or transferred to a hospital within 96 hours after admission.     DVT prophylaxis:   [x] Lovenox   [] SCDs   [] SQ Heparin   [] Encourage ambulation, low risk for DVT, no chemical or mechanical    prophylaxis necessary      [] Already on Anticoagulation    Anticipated Disposition upon discharge:   [x] Home   [] Home with Home Health   [] [facility]   [] [address] [place] [address],[address]      Electronically signed by Juan Pablo Amaya MD on 1/17/2021 at 10:34 AM

## 2021-01-17 NOTE — PROGRESS NOTES
Brother, Helen Cox, called wanting update on patient, this RN clarifies with patient if her health information can be given, permission granted. Update given to brother, questions answered at this time.

## 2021-01-17 NOTE — PLAN OF CARE
Problem: Pain:  Goal: Pain level will decrease  Description: Pain level will decrease  Outcome: Met This Shift     Problem: SAFETY  Goal: Free from accidental physical injury  Outcome: Met This Shift  Goal: Free from intentional harm  Outcome: Met This Shift

## 2021-01-17 NOTE — ANESTHESIA PRE PROCEDURE
Department of Anesthesiology  Preprocedure Note       Name:  Glean Krabbe   Age:  42 y.o.  :  1980                                          MRN:  [id number]         Date:  2021      Surgeon: Milo Freire):  Sophie Downs MD    Procedure: Procedure(s): FEMUR IM NAIL PRESLEY INSERTION    Medications prior to admission:   Prior to Admission medications    Medication Sig Start Date End Date Taking?  Authorizing Provider   ibuprofen (ADVIL;MOTRIN) 200 MG tablet Take 800 mg by mouth every 6 hours as needed for Pain   Yes Historical Provider, MD       Current medications:    Current Facility-Administered Medications   Medication Dose Route Frequency Provider Last Rate Last Admin    0.9 % sodium chloride infusion   Intravenous Continuous Yasir Hendrix  mL/hr at 21 0200 New Bag at 21 0200    morphine sulfate (PF) injection 4 mg  4 mg Intravenous Q3H PRN Nara Rose MD   4 mg at 21 0742    HYDROcodone-acetaminophen (NORCO) 5-325 MG per tablet 1 tablet  1 tablet Oral Q4H PRN Nara Rose MD   1 tablet at 21 0743    sodium chloride flush 0.9 % injection 10 mL  10 mL Intravenous 2 times per day Nara Rose MD   10 mL at 21 0745    sodium chloride flush 0.9 % injection 10 mL  10 mL Intravenous PRN MD Jaun Parker [START ON 2021] enoxaparin (LOVENOX) injection 40 mg  40 mg Subcutaneous Daily Nara Rose MD        promethazine (PHENERGAN) tablet 12.5 mg  12.5 mg Oral Q6H PRN Nara Rose MD        Or    ondansetron (ZOFRAN) injection 4 mg  4 mg Intravenous Q6H PRN Nara Rose MD        polyethylene glycol (GLYCOLAX) packet 17 g  17 g Oral Daily PRN Nara Rose MD        acetaminophen (TYLENOL) tablet 650 mg  650 mg Oral Q6H PRN Nara Rose MD        Or    acetaminophen (TYLENOL) suppository 650 mg  650 mg Rectal Q6H PRN Nara Rose MD  0.9 % sodium chloride infusion   Intravenous Continuous Clemente Clark MD 75 mL/hr at 21 0441 New Bag at 21 0441    0.9 % sodium chloride infusion   Intravenous PRN Clemente Clark MD        pantoprazole (PROTONIX) tablet 40 mg  40 mg Oral QAM AC Clemente Clark MD           Allergies:  No Known Allergies    Problem List:    Patient Active Problem List   Diagnosis Code    Closed fracture of right hip ([facility] [address] S72.001A    Closed hip fracture, right, initial encounter ([facility] [address] S72.001A       Past Medical History:  History reviewed. No pertinent past medical history. Past Surgical History:        Procedure Laterality Date     SECTION         Social History:    Social History     Tobacco Use    Smoking status: Current Every Day Smoker     Packs/day: 0.50     Types: Cigarettes    Smokeless tobacco: Never Used   Substance Use Topics    Alcohol use: Yes     Alcohol/week: 2.0 standard drinks     Types: 2 Cans of beer per week                                Ready to quit: Not Answered  Counseling given: Not Answered      Vital Signs (Current):   Vitals:    21 0845 21 0900 21 0915 21 0945   BP: (!) 147/88 (!) 151/88 (!) 154/82 (!) 150/78   Pulse: 86 87 86 84   Resp: 16 16 16 16   Temp: 36.7 °C (98.1 °F) 36.7 °C (98 °F) 36.7 °C (98.1 °F) 36.8 °C (98.2 °F)   TempSrc: Oral Oral Oral Oral   SpO2: 95% 95% 95% 95%   Weight:       Height:                                                  BP Readings from Last 3 Encounters:   21 (!) 150/78   19 (!) 160/78       NPO Status:                                                                                 BMI:   Wt Readings from Last 3 Encounters:   21 260 lb 11.2 oz (118.3 kg)   19 230 lb 12.8 oz (104.7 kg)     Body mass index is 40.83 kg/m².     CBC:   Lab Results   Component Value Date    WBC 14.7 2021    RBC 4.25 2021    HGB 8.3 2021    HCT 27.4 2021    MCV 64.4 2021 RDW 17.5 01/17/2021     01/17/2021       CMP:   Lab Results   Component Value Date     01/17/2021    K 3.9 01/17/2021     01/17/2021    CO2 21 01/17/2021    BUN 10 01/17/2021    CREATININE 0.69 01/17/2021    GFRAA >60 01/17/2021    LABGLOM >60 01/17/2021    GLUCOSE 116 01/17/2021    CALCIUM 9.1 01/17/2021       POC Tests: No results for input(s): POCGLU, POCNA, POCK, POCCL, POCBUN, POCHEMO, POCHCT in the last 72 hours. Coags: No results found for: PROTIME, INR, APTT    HCG (If Applicable): No results found for: PREGTESTUR, PREGSERUM, HCG, HCGQUANT     ABGs: No results found for: PHART, PO2ART, SED5TMQ, QKO4RZV, BEART, G7IEGYXG     Type & Screen (If Applicable):  No results found for: LABABO, LABRH    Drug/Infectious Status (If Applicable):  No results found for: HIV, HEPCAB    COVID-19 Screening (If Applicable):   Lab Results   Component Value Date    COVID19 Not Detected 01/17/2021         Anesthesia Evaluation  Patient summary reviewed and Nursing notes reviewed no history of anesthetic complications:   Airway: Mallampati: II  TM distance: >3 FB   Neck ROM: full  Mouth opening: > = 3 FB Dental: normal exam         Pulmonary:Negative Pulmonary ROS and normal exam  breath sounds clear to auscultation                             Cardiovascular:Negative CV ROS          ECG reviewed  Rhythm: regular  Rate: normal                    Neuro/Psych:   Negative Neuro/Psych ROS              GI/Hepatic/Renal: Neg GI/Hepatic/Renal ROS            Endo/Other:    (+) blood dyscrasia: anemia:., .                 Abdominal:   (+) obese,         Vascular: negative vascular ROS. Anesthesia Plan      spinal and general     ASA 3 - emergent     (General vs spinal )  Induction: intravenous. MIPS: Postoperative opioids intended and Prophylactic antiemetics administered. Anesthetic plan and risks discussed with patient. Use of blood products discussed with patient whom. Plan discussed with attending.                   Jevon Gold, KATHY - CRNA   1/17/2021

## 2021-01-18 LAB
ANION GAP SERPL CALCULATED.3IONS-SCNC: 7 MMOL/L (ref 9–17)
BUN BLDV-MCNC: 7 MG/DL (ref 6–20)
BUN/CREAT BLD: 13 (ref 9–20)
CALCIUM SERPL-MCNC: 8.7 MG/DL (ref 8.6–10.4)
CHLORIDE BLD-SCNC: 107 MMOL/L (ref 98–107)
CO2: 23 MMOL/L (ref 20–31)
CREAT SERPL-MCNC: 0.56 MG/DL (ref 0.5–0.9)
GFR AFRICAN AMERICAN: >60 ML/MIN
GFR NON-AFRICAN AMERICAN: >60 ML/MIN
GFR SERPL CREATININE-BSD FRML MDRD: ABNORMAL ML/MIN/{1.73_M2}
GFR SERPL CREATININE-BSD FRML MDRD: ABNORMAL ML/MIN/{1.73_M2}
GLUCOSE BLD-MCNC: 144 MG/DL (ref 70–99)
HCT VFR BLD CALC: 29.8 % (ref 36–46)
HEMOGLOBIN: 9.3 G/DL (ref 12–16)
MCH RBC QN AUTO: 21.6 PG (ref 26–34)
MCHC RBC AUTO-ENTMCNC: 31.1 G/DL (ref 31–37)
MCV RBC AUTO: 69.6 FL (ref 80–100)
NRBC AUTOMATED: ABNORMAL PER 100 WBC
PDW BLD-RTO: 20.9 % (ref 12.1–15.2)
PLATELET # BLD: 208 K/UL (ref 140–450)
PMV BLD AUTO: ABNORMAL FL (ref 6–12)
POTASSIUM SERPL-SCNC: 4.1 MMOL/L (ref 3.7–5.3)
RBC # BLD: 4.28 M/UL (ref 4–5.2)
SODIUM BLD-SCNC: 137 MMOL/L (ref 135–144)
WBC # BLD: 13.6 K/UL (ref 3.5–11)

## 2021-01-18 PROCEDURE — 2580000003 HC RX 258: Performed by: ORTHOPAEDIC SURGERY

## 2021-01-18 PROCEDURE — 94761 N-INVAS EAR/PLS OXIMETRY MLT: CPT

## 2021-01-18 PROCEDURE — 36415 COLL VENOUS BLD VENIPUNCTURE: CPT

## 2021-01-18 PROCEDURE — 85027 COMPLETE CBC AUTOMATED: CPT

## 2021-01-18 PROCEDURE — 2700000000 HC OXYGEN THERAPY PER DAY

## 2021-01-18 PROCEDURE — 6370000000 HC RX 637 (ALT 250 FOR IP): Performed by: INTERNAL MEDICINE

## 2021-01-18 PROCEDURE — 6360000002 HC RX W HCPCS: Performed by: ORTHOPAEDIC SURGERY

## 2021-01-18 PROCEDURE — 1200000000 HC SEMI PRIVATE

## 2021-01-18 PROCEDURE — 80048 BASIC METABOLIC PNL TOTAL CA: CPT

## 2021-01-18 PROCEDURE — 6370000000 HC RX 637 (ALT 250 FOR IP): Performed by: ORTHOPAEDIC SURGERY

## 2021-01-18 PROCEDURE — 97162 PT EVAL MOD COMPLEX 30 MIN: CPT

## 2021-01-18 RX ORDER — FERROUS SULFATE 325(65) MG
325 TABLET ORAL 2 TIMES DAILY WITH MEALS
Status: DISCONTINUED | OUTPATIENT
Start: 2021-01-18 | End: 2021-01-20 | Stop reason: HOSPADM

## 2021-01-18 RX ADMIN — WATER 1 G: 1 INJECTION INTRAMUSCULAR; INTRAVENOUS; SUBCUTANEOUS at 02:21

## 2021-01-18 RX ADMIN — Medication 10 ML: at 21:04

## 2021-01-18 RX ADMIN — FERROUS SULFATE TAB 325 MG (65 MG ELEMENTAL FE) 325 MG: 325 (65 FE) TAB at 08:35

## 2021-01-18 RX ADMIN — ENOXAPARIN SODIUM 40 MG: 40 INJECTION SUBCUTANEOUS at 21:05

## 2021-01-18 RX ADMIN — OXYCODONE HYDROCHLORIDE 10 MG: 5 TABLET ORAL at 19:41

## 2021-01-18 RX ADMIN — SODIUM CHLORIDE: 9 INJECTION, SOLUTION INTRAVENOUS at 09:09

## 2021-01-18 RX ADMIN — FOLIC ACID 1 MG: 1 TABLET ORAL at 08:35

## 2021-01-18 RX ADMIN — MORPHINE SULFATE 4 MG: 4 INJECTION, SOLUTION INTRAMUSCULAR; INTRAVENOUS at 16:59

## 2021-01-18 RX ADMIN — WATER 1 G: 1 INJECTION INTRAMUSCULAR; INTRAVENOUS; SUBCUTANEOUS at 16:58

## 2021-01-18 RX ADMIN — THIAMINE HCL TAB 100 MG 100 MG: 100 TAB at 08:35

## 2021-01-18 RX ADMIN — DOCUSATE SODIUM 50 MG AND SENNOSIDES 8.6 MG 1 TABLET: 8.6; 5 TABLET, FILM COATED ORAL at 08:35

## 2021-01-18 RX ADMIN — FERROUS SULFATE TAB 325 MG (65 MG ELEMENTAL FE) 325 MG: 325 (65 FE) TAB at 16:59

## 2021-01-18 RX ADMIN — PANTOPRAZOLE SODIUM 40 MG: 40 TABLET, DELAYED RELEASE ORAL at 08:35

## 2021-01-18 RX ADMIN — WATER 1 G: 1 INJECTION INTRAMUSCULAR; INTRAVENOUS; SUBCUTANEOUS at 08:36

## 2021-01-18 RX ADMIN — OXYCODONE HYDROCHLORIDE 10 MG: 5 TABLET ORAL at 08:35

## 2021-01-18 ASSESSMENT — PAIN DESCRIPTION - ORIENTATION
ORIENTATION: RIGHT

## 2021-01-18 ASSESSMENT — PAIN SCALES - GENERAL
PAINLEVEL_OUTOF10: 7
PAINLEVEL_OUTOF10: 0

## 2021-01-18 ASSESSMENT — PAIN DESCRIPTION - LOCATION: LOCATION: HIP

## 2021-01-18 ASSESSMENT — PAIN DESCRIPTION - PAIN TYPE
TYPE: SURGICAL PAIN
TYPE: SURGICAL PAIN

## 2021-01-18 NOTE — PROGRESS NOTES
Quality flow rounds held on 1/18/21     Hiren Cannon is admitted for  Closed fracture of right hip ([facility] [address] Length of stay 1. Education:    Needed Education: weight bearing status, meds, follow up,diet, wound care      Do you have any questions regarding your plan of care while at the hospital? denies    Planned Disposition:               [x]  Home when able [facility] agency of choice               [] Swing Bed                [] ECF/SNF               [] Other/TBD    Barriers to Discharge:    Can you afford your medications? yes   Do you have transportation to follow up appointments? Drives self   Do you need any new equipment at home? walker   Current equipment includes   none    Do you have a living will or durable power of  for healthcare? denies               If yes do we have a copy on file? n/a    Do you or your family have any questions or concerns we haven't already discussed? Denies       Lives with children independent. Bedroom is upstairs, bathroom is downstairs. Undecided at this time of discharge plan, is waiting to see how therapy session goes. Will continue to monitor for discharge needs. Arturo Dangelo and writer present for rounding. Pt states has no PCP, requesting to be connected with Dr Abe Valdez. RN will make f/u appt for pt while she is here. Will also make her ortho appt with Dr Manuel James prior to her discharge.

## 2021-01-18 NOTE — ADDENDUM NOTE
Addendum  created 01/17/21 2145 by KATHY Lugo CRNA    Intraprocedure Meds edited, Orders acknowledged in Narrator

## 2021-01-18 NOTE — PROGRESS NOTES
[facility]  Physical Therapy  Evaluation  Date: 2021  Patient Name: Haleigh Lomas        MRN: [id number]    : 1980  (42 y.o.)  Gender: female   Referring Practitioner: Dr. Dede Curling;  Dr. Odonnell Pro - Hospitalist  Diagnosis: Kelly Yo hip fracture with ORIF  Additional Pertinent Hx: Patient fell in bathroom sustaining right intertrochanteric fracture and underwent ORIF 21. Referred to PT for TTWB mobility   History reviewed. No pertinent past medical history. Past Surgical History:   Procedure Laterality Date     SECTION         Restrictions         Subjective         Pain Level: 0    Orientation  Overall Orientation Status: Within Normal Limits    Home Living  Type of Home: House  Home Layout: Two level(able to move bedroom to main floor)  Entrance Stairs - Number of Steps: 3  Entrance Stairs - Rails: Right  Home Access: Stairs to enter with rails    Prior Level of Function  Prior Function  ADL Assistance: Independent  Homemaking Assistance: Independent  Ambulation Assistance: Independent  Transfer Assistance: Independent      Objective                 PROM RLE (degrees)  RLE General PROM: to 90 deg hip flexion and knee flex seated at edge of bed; ankle [facility]     Strength RLE  Comment: Not formally tested due to recent surgery and pain level. Generally < 3/5 as unable to lift right LE independently  Strength LLE  Strength LLE: WNL  Strength RUE  Strength RUE: WNL  Strength LUE  Strength LUE: WNL             Sit to Supine: Minimal assistance(for RLE managment)  Scooting: Minimal assistance(for RLE management)  Sit to Stand: Contact guard assistance, Minimal Assistance  Stand to sit: Contact guard assistance  Bed to Chair: Contact guard assistance, Minimal assistance              Balance  Sitting - Static: Good  Sitting - Dynamic: Good  Standing - Static: Good    Assessment  Activity Tolerance: Patient Tolerated treatment well   Chart Reviewed:  Yes  Assessment: Patient admitted following a fall at home sustaining right hip fracture and underwent ORIF 1/17/21. She presently is TTWB RLE. Currently she requires CG/min assist for basic bed mobility, transfers and short distance ambulation to chair. She is able to appropriately maintain TTWB. Feel patient would benefit from 1-2 days for progressive ambulation and to complete stair ambulation to enter home and then may discharge with home care PT  Prognosis: Good  Discharge Recommendations: Home with assist PRN, Home with Home health PT     Type of devices: Gait belt, Nurse notified, Left in chair, Call light within reach     Plan  Times per week: Daily  Times per day: Twice a day  Current Treatment Recommendations: Strengthening, Functional Mobility Training, Gait Training, Stair training, Endurance Training, Transfer Training, Home Exercise Program, Safety Education & Training, Patient/Caregiver Education & Training    Goals  Short term goals  Time Frame for Short term goals: 4 days - expires 1/21/21/  Short term goal 1: Educate on independent right LE isometric and ROM ex for ankle, knee and hip  Short term goal 2:  Independent transfers in/out of bed and chair to safely return home  Short term goal 3: Ambulate with wh walker TTWB RLE mod independent x 45-50 to negotiate home environment  Short term goal 4: Up/down steps with right HR and 1 Cg/min to safely enter/exit home            Time In: 1045  Time Out: 1115  Timed Coded Minutes: 0  Total Treatment Time: 1920 Orderlord, PT 1/18/2021

## 2021-01-18 NOTE — PROGRESS NOTES
Interventions:   Food and/or Nutrient Delivery:  Continue Current Diet, Continue Oral Nutrition Supplement  Nutrition Education/Counseling:  No recommendation at this time   Coordination of Nutrition Care:  Continue to monitor while inpatient    Goals:  PO>75% meals and supplement       Nutrition Monitoring and Evaluation:   Behavioral-Environmental Outcomes:  None Identified   Food/Nutrient Intake Outcomes:  Food and Nutrient Intake, Supplement Intake  Physical Signs/Symptoms Outcomes:  Biochemical Data, Fluid Status or Edema, Weight     Discharge Planning:    Continue current diet     Electronically signed by Denny Barron RD, LD on 1/18/21 at 7:56 AM EST    Contact: [phone]

## 2021-01-18 NOTE — PROGRESS NOTES
Hospitalist Progress Note  1/18/2021 4:43 AM  Subjective:   Admit Date: 1/17/2021  PCP: No primary care provider on file. Interval History: Roseann Chavis has no complaints his am.  She did well during and after surgery yesterday afternoon. No chest pain or SOB this morning. She denies having nausea. Roseann Chavis feels her pain is controlled. Diet: Dietary Nutrition Supplements: Standard High Calorie Oral Supplement  DIET GENERAL;  Medications:   Scheduled Meds:   sodium chloride flush  10 mL Intravenous 2 times per day    pantoprazole  40 mg Oral QAM AC    folic acid  1 mg Oral Daily    thiamine  100 mg Oral Daily    enoxaparin  40 mg Subcutaneous Daily    sennosides-docusate sodium  1 tablet Oral BID    ceFAZolin (ANCEF) IVPB  1 g Intravenous Q8H     Continuous Infusions:   sodium chloride Stopped (01/17/21 2013)       Patient's current medications documented, reviewed, and updated. CBC:   Recent Labs     01/17/21  0158 01/17/21  1151 01/17/21  2140   WBC 14.7*  --   --    HGB 8.3* 9.7* 10.0*     --   --      BMP:    Recent Labs     01/17/21  0158      K 3.9      CO2 21   BUN 10   CREATININE 0.69   GLUCOSE 116*     Hepatic: No results for input(s): AST, ALT, ALB, BILITOT, ALKPHOS in the last 72 hours. Troponin: No results for input(s): TROPONINI in the last 72 hours. BNP: No results for input(s): BNP in the last 72 hours. Lipids: No results for input(s): CHOL, HDL in the last 72 hours. Invalid input(s): LDLCALCU  INR: No results for input(s): INR in the last 72 hours. Objective:   Vitals: BP (!) 147/82   Pulse 88   Temp 98.2 °F (36.8 °C) (Oral)   Resp 20   Ht 5' 7\" (1.702 m)   Wt 260 lb 11.2 oz (118.3 kg)   LMP 12/21/2020 Comment: pt denies any chance of pregnancy  SpO2 95%   BMI 40.83 kg/m²   General appearance: alert and cooperative with exam  HEENT: Head: Normocephalic, no lesions, without obvious abnormality.   Eye: Normal external eye, conjunctiva, lids cornea, MARYANNE.  Nose: Normal external nose, mucus membranes and septum. Nose: Nasal cannula / End tidal CO2 monitor on. Neck: no adenopathy, no carotid bruit and supple, symmetrical, trachea midline  Lungs: clear to auscultation bilaterally  Heart: regular rate and rhythm, S1, S2 normal, no murmur, click, rub or gallop  Abdomen: soft, non-tender; bowel sounds normal; no masses,  no organomegaly and obese. Extremities: SCD's on, no calf tenderness. Post op right hip surgery. Neurologic: Mental status: Alert, oriented, thought content appropriate    Assessment and Plan:   1. Closed right hip fracture - S/P right femur IM nail dulce insertion by Dr. Vikki Thomas on 1/17. 2.  Iron deficiency anemia - S/P 2 units of PRBC's before surgery. Will start on iron replacement. Should have further work up as an out patient. 3.  Alcohol use - Patient denies withdrawal symptoms in the past if she doesn't drink. Banana bag given upon admission with Thiamine and Folate ordered daily. 4.  Obesity. Plan:  1. Start PT / OT today. 2.  Repeat labs this am.  3.  Start on iron replacement. 4.   for DC planning. Patient continues to require in patient admission secondary to pain control and starting on therapy.       Patient Active Problem List:     Closed fracture of right hip ([facility] [address]     Closed hip fracture, right, initial encounter ([facility] [address]      Nicki Arzate MD  [address] [place] [address]

## 2021-01-18 NOTE — OP NOTE
Operative Note      Patient: Ciarra Medina  YOB: 1980  MRN: [id number]    Date of Procedure: 1/17/2021    Pre-Op Diagnosis: intertrochanteric right hip fracture    Post-Op Diagnosis: Same       Procedure(s): FEMUR IM NAIL PRESLEY INSERTION    Surgeon(s):  Amanda Camilo MD    Assistant:   Surgical Assistant: Danny Landin CNP: Assisted with patient positioning, draping, surgical procedure, wound closure, placement of dressing    Anesthesia: General    Estimated Blood Loss (mL): 50 ml    Complications: None    Specimens:   * No specimens in log *    Implants:  Implant Name Type Inv. Item Serial No.  Lot No. LRB No. Used Action   NAIL IM L170MM [id number] 130DEG SHT PROX FEM GRN TI DEEPAK JUANITA  NAIL IM L170MM [id number] 130DEG SHT PROX FEM GRN TI DEEPAK JUANITA  [employer] [id number] Right 1 Implanted   BLADE IM L105MM DIA10.35MM PROX FEM G TI DEEPAK FEN THUAN FOR  BLADE IM L105MM DIA10.35MM PROX FEM G TI DEEPAK FEN THUAN FOR  DEPU[employer] [id number] Right 1 Implanted   SCREW BNE L40MM DIA5MM ST TIB LT GRN TI ST DEEPAK ARAVIND FULL  SCREW BNE L40MM DIA5MM ST TIB LT GRN TI ST DEEPAK ARAVIND FULL  DEPU[employer] [id number] Right 1 Implanted         Drains:   Urethral Catheter Double-lumen 16 fr (Active)   Catheter Indications Perioperative use in selected surgeries including but not limited to urologic, pelvic or need for intraoperative monitoring of urinary output due to prolonged surgery, large volume infusion or need for diuretic therapy in surgery 01/17/21 0730   Site Assessment Pink 01/17/21 0730   Urine Color Yellow 01/17/21 0730   Urine Appearance Clear 01/17/21 0730   Urine Odor Other (Comment) 01/17/21 0730   Output (mL) 850 mL 01/17/21 0711       Findings: Reduced intertrochanteric fracture with basicervical component    Detailed Description of Procedure:   After informed consent was obtained the patient brought to the operating room where a general anesthetic was administered.   Patient was placed on the fracture table and traction was applied. Fracture was found to be comminuted fracture of the intertrochanteric region with a basicervical component. With traction and manipulation being held the fracture could be held in a reduced position. The right hip was prepped and draped in the usual sterile fashion. A 10 cm incision was made proximal to the greater trochanter in line with the femur. Initially this was made smaller but due to the large amount of adipose tissue a larger incision had to be made in order to get the guiding needle introduced into the femoral canal.  The guidepin was placed at the appropriate position on the greater trochanter. Preoperatively this was measured to a size 10 mm Synthes short trochanteric fixation nail. Attempts at passing the nail were unsuccessful and the nail was removed and sequential reaming was performed to 12 mm. At this point the nail was then introduced without difficulty. Through a separate more distal incision the guidewire was introduced into the center center position of the femoral head. In order to get appropriate placement this required an posteriorly directed force onto the hip joint in order to maintain the reduction on the lateral view. The guidepin was measured overreamed and then a 105 mm spiral blade was placed locked and then backed off. Compression then was performed at the fracture site. Next to the distal guide a 40 mm distal locking screw was placed. X-rays were revealed appropriate placement. Final x-rays in the AP and lateral planes revealed a reduced comminuted basicervical and intertrochanteric fracture and implants in good alignment. The wounds were irrigated. Fascia was closed with a 0 Ethibond suture. Skin was closed in layers. Sterile dressing was placed. Patient was awakened and brought to the recovery room in stable condition. There were no intraoperative or immediate postoperative complications. .    Electronically signed by Jodee Veelz MD on 1/17/2021 at 9:01 PM

## 2021-01-18 NOTE — ANESTHESIA POSTPROCEDURE EVALUATION
Department of Anesthesiology  Postprocedure Note    Patient: Neida Arellano  MRN: [id number]  YOB: 1980  Date of evaluation: 1/17/2021  Time:  9:26 PM     Procedure Summary     Date: 01/17/21 Room / Location: [address] [place] [address]    Anesthesia Start: 1816 Anesthesia Stop:     Procedure: FEMUR IM NAIL PRESLEY INSERTION (Right ) Diagnosis: (intertrochanteric right hip fracture)    Surgeons: Paulina Garcia MD Responsible Provider: KATHY Sauceda CRNA    Anesthesia Type: spinal, general ASA Status: 3 - Emergent          Anesthesia Type: spinal, general    Fuentes Phase I: Fuentes Score: 8    Fuentes Phase II: Fuentes Score: 8    Last vitals: Reviewed and per EMR flowsheets.        Anesthesia Post Evaluation    Patient location during evaluation: PACU  Patient participation: complete - patient participated  Level of consciousness: lethargic and awake  Pain score: 0  Airway patency: patent  Nausea & Vomiting: no nausea and no vomiting  Complications: no  Cardiovascular status: blood pressure returned to baseline and hemodynamically stable  Respiratory status: acceptable, room air and spontaneous ventilation  Hydration status: euvolemic

## 2021-01-18 NOTE — PROGRESS NOTES
Pt slightly more arousal at this time. Takes small amount of ice chips without swallowing deficits. Ice to right leg. No drainage on dressing. Circ checks WNL. Toes pink and warm. Pt able to move easily. Capnography in place as O2 sats drop to mid 80's when pt is sleeping. She denies pain presently. Monitor shows SR. Call light within her reach.

## 2021-01-18 NOTE — PROGRESS NOTES
Only 1 unit of order entered per Dr. Cynthia Nicolas, after clarification with RN supervisor, Dr. Haider Schmidt ordered 2 units for blood. Order for 1 unit of blood entered, 2 units of blood given this morning.

## 2021-01-18 NOTE — PROGRESS NOTES
BREAST PROCEDURE - POST CARE INSTRUCTIONS    Procedure:  Left Stereotactically Guided Core Biopsy  performed by Dr. Mae.      You may notice some tenderness,bruising or discoloration around the site of your procedure.  This is normal and should disappear gradually over the next 7 to 10 days.  You may develop firmness at the biopsy site that is tender to the touch.  An area that is small (e.g. the size of a marble) is normal bruising and will gradually go away.  If you notice a firm area that is larger (e.g. the size of an egg or bigger), please contact your physician.    If you notice excessive bleeding (bleeding that you cannot control with 15 minutes of direct, continuous, firm pressure), redness, heat, drainage or have severe pain from the procedure site, please contact the physician that ordered your procedure.  If you are unable to reach your ordering physician, please contact: Breast Imaging Nurse Rubia IBARRA at [phone] between the hours of 8:00- 4:30.  If the nurse is unavailable or out, please call the Breast Imaging Department at [phone]. After 4:30 pm, please call the Radiology Resident on call at [phone].  .    Day of the Procedure - Breast Biopsy    · Wear a good, supportive bra for the day and night of the procedure.  If your breast is still sore after this time frame, continue to wear a supportive bra at night.  · Place an ice pack inside your bra on top of the dressing for 20 minutes every hour until bedtime.  · You may take Tylenol (acetaminophen) for discomfort.   · DO NOT take aspirin, ibuprofen, Advil, Motrin, naproxen sodium or Aleve for at least 24 hours after the procedure unless specifically approved by your physician.  · DO NOT participate in strenuous activity for at least 24 hours after your procedure (sports, exercise, heavy lifting).  Do not lift more than 10 pounds with the biopsy side (approximately equal to one gallon of milk) for 48 hours.  · Avoid getting the  SW met with pt to complete assessment during quality rounds with nurse . Pt is alert and oriented and cooperative with assessment. Pt is a 42year old female admitted for closed fracture of right hip and s/p repair of same. Pt lives with her son, son's fiance, and her dtr at their home in [facility]. Pt reports that her bedroom is on the second floor but her son can move her bed downstairs for her. Pt states that her bathroom is on the first floor. Pt was not using any DME or community services prior to admission. Pt was driving prior to her injury and states that family can assist with transport until she is able to again. Pt is a full code and does not currently have a PCP but requests Dr Charley Hawk to follow her at discharge and follow up appointment to be scheduled with her and Dr Artemio López. Pt does not have advance directives and is not currently interested in these. Pt reports that her medications are affordable. Discussed discharge options available to pt including rehab at nursing facility, swing bed, and home with home health services. Pt states that she was considering going home with family to assist. PT to work with pt today and will see what their recommendations will be. Pt to discuss with her family and may be interested in swing bed if needed. SW will continue to follow and remain available.  Arya BOLAÑOS 1/18/2021 procedure area wet for at least 24 hours.  You may sponge bathe if needed.    Continued Care    · Remove the outer dressing if it becomes blood stained and apply a new bandage or clean dressing over the steri-strips.  You may remove the outer dressing the day after your procedure.  Do not remove the steri-strips.  They will fall off on their own in about a week.   · You may take a shower or tub bath with the steri-strips in place.  It is okay to get the steri-strips wet, but do not scrub the area.  If the edges curl up, you may trim them with a scissors.  You can use a band aid if you are having any drainage or a steri-strip falls off.  · You may resume most normal activities the next day.  However, please avoid swimming and hot tub use until the incision is healed.    Results    · You will be notified of your procedure results by the Radiologist within 2 business days.  · Your mammogram for marker/clip placement after your breast biopsy showed the breast marker is in the desired position..

## 2021-01-18 NOTE — PROGRESS NOTES
SW spoke with PT after they met with pt and pt did well with them. Pt will need a front wheeled walker for home use and could return home with home health services safely. Pt would like to work with pt on ambulating and stairs tomorrow. SW will take a home health list to pt for her preference in which agency to use.  Alberto Lau MSW LSW 1/18/2021

## 2021-01-18 NOTE — PLAN OF CARE
[facility]  Inpatient Physical Therapy  Plan of Care  Date: 2021  Patient Name: Adam Sargent        : 1980  (42 y.o.)  Referring Practitioner: Dr. Imani Goode  Admission Date: 2021  Referral Date : 21  Diagnosis: Rigjht hip fracture with ORIF  Treatment Diagnosis: Difficulty with amb  PT Orders Received and Evaluation Complete  Identified Problem Areas:  Prognosis: Good    Justification for Skilled Services:  [] Reduce Falls   [x] Improve Ambulation  [x]  Complete Daily Tasks Safely   [x] Improve Balance   [x] Improve LE strength  []  Return to Prior Level of Function  [x] Improve Functional Mobility   [x]  Family/Caregiver Education  [x] Patient Education: [x]Assistive Devices [x]Home Exercise Program and Progression    Plan  Times per week: Daily  Times per day: Twice a day  [] Modalities:  [x] Therapeutic Exercise   [x] Gait Training  [x] Therapeutic Activity    [] Home Safety Evaluation         [] Massage                        [] Neuromuscular Re-education [] Back Education             [x] Patient Education [x] Home Exercise Program  Discharge Recommendations: Home with assist PRN, Home with Home health PT    Rehab Potential:  [x]  Good [] Fair   []  Poor    Goals  Short term goals  Time Frame for Short term goals: 4 days - expires 21/  Short term goal 1: Educate on independent right LE isometric and ROM ex for ankle, knee and hip  Short term goal 2: Independent transfers in/out of bed and chair to safely return home  Short term goal 3: Ambulate with wh walker TTWB RLE mod independent x 45-50 to negotiate home environment  Short term goal 4: Up/down steps with right HR and 1 Cg/min to safely enter/exit home         Upon Swingbed Transfer this Plan of Care will be followed until revision is complete.     SUJATA JACKSON, PT   Date: 2021

## 2021-01-19 LAB
HCT VFR BLD CALC: 27.7 % (ref 36–46)
HEMOGLOBIN: 8.5 G/DL (ref 12–16)
MCH RBC QN AUTO: 21.5 PG (ref 26–34)
MCHC RBC AUTO-ENTMCNC: 30.5 G/DL (ref 31–37)
MCV RBC AUTO: 70.3 FL (ref 80–100)
MORPHOLOGY: NORMAL
NRBC AUTOMATED: ABNORMAL PER 100 WBC
PDW BLD-RTO: 20.4 % (ref 12.1–15.2)
PLATELET # BLD: 189 K/UL (ref 140–450)
PMV BLD AUTO: ABNORMAL FL (ref 6–12)
RBC # BLD: 3.94 M/UL (ref 4–5.2)
WBC # BLD: 8.9 K/UL (ref 3.5–11)

## 2021-01-19 PROCEDURE — 97110 THERAPEUTIC EXERCISES: CPT

## 2021-01-19 PROCEDURE — 36415 COLL VENOUS BLD VENIPUNCTURE: CPT

## 2021-01-19 PROCEDURE — 97535 SELF CARE MNGMENT TRAINING: CPT

## 2021-01-19 PROCEDURE — 6360000002 HC RX W HCPCS: Performed by: ORTHOPAEDIC SURGERY

## 2021-01-19 PROCEDURE — 97166 OT EVAL MOD COMPLEX 45 MIN: CPT

## 2021-01-19 PROCEDURE — 94761 N-INVAS EAR/PLS OXIMETRY MLT: CPT

## 2021-01-19 PROCEDURE — 6370000000 HC RX 637 (ALT 250 FOR IP): Performed by: INTERNAL MEDICINE

## 2021-01-19 PROCEDURE — 6370000000 HC RX 637 (ALT 250 FOR IP): Performed by: ORTHOPAEDIC SURGERY

## 2021-01-19 PROCEDURE — 1200000000 HC SEMI PRIVATE

## 2021-01-19 PROCEDURE — 97116 GAIT TRAINING THERAPY: CPT

## 2021-01-19 PROCEDURE — 85027 COMPLETE CBC AUTOMATED: CPT

## 2021-01-19 PROCEDURE — 2580000003 HC RX 258: Performed by: ORTHOPAEDIC SURGERY

## 2021-01-19 RX ADMIN — THIAMINE HCL TAB 100 MG 100 MG: 100 TAB at 07:51

## 2021-01-19 RX ADMIN — OXYCODONE HYDROCHLORIDE 10 MG: 5 TABLET ORAL at 18:16

## 2021-01-19 RX ADMIN — OXYCODONE HYDROCHLORIDE 10 MG: 5 TABLET ORAL at 05:06

## 2021-01-19 RX ADMIN — Medication 10 ML: at 20:53

## 2021-01-19 RX ADMIN — Medication 10 ML: at 07:52

## 2021-01-19 RX ADMIN — MORPHINE SULFATE 4 MG: 4 INJECTION, SOLUTION INTRAMUSCULAR; INTRAVENOUS at 00:34

## 2021-01-19 RX ADMIN — FERROUS SULFATE TAB 325 MG (65 MG ELEMENTAL FE) 325 MG: 325 (65 FE) TAB at 16:26

## 2021-01-19 RX ADMIN — FOLIC ACID 1 MG: 1 TABLET ORAL at 07:51

## 2021-01-19 RX ADMIN — PANTOPRAZOLE SODIUM 40 MG: 40 TABLET, DELAYED RELEASE ORAL at 05:06

## 2021-01-19 RX ADMIN — MORPHINE SULFATE 4 MG: 4 INJECTION, SOLUTION INTRAMUSCULAR; INTRAVENOUS at 14:07

## 2021-01-19 RX ADMIN — FERROUS SULFATE TAB 325 MG (65 MG ELEMENTAL FE) 325 MG: 325 (65 FE) TAB at 07:51

## 2021-01-19 RX ADMIN — ENOXAPARIN SODIUM 40 MG: 40 INJECTION SUBCUTANEOUS at 07:51

## 2021-01-19 ASSESSMENT — PAIN DESCRIPTION - PAIN TYPE: TYPE: SURGICAL PAIN

## 2021-01-19 ASSESSMENT — PAIN SCALES - GENERAL
PAINLEVEL_OUTOF10: 10
PAINLEVEL_OUTOF10: 2
PAINLEVEL_OUTOF10: 4
PAINLEVEL_OUTOF10: 7

## 2021-01-19 ASSESSMENT — PAIN DESCRIPTION - ORIENTATION
ORIENTATION: RIGHT
ORIENTATION: RIGHT

## 2021-01-19 ASSESSMENT — PAIN DESCRIPTION - LOCATION: LOCATION: HIP

## 2021-01-19 NOTE — PROGRESS NOTES
Phone: Cindy  Date: 2021  Fax: [phone]      Physical Therapy    Daily Note    Patient Name: Galdino Templeton      : 1980  (42 y.o.)  MRN: [id number]     Pt is PROGRESSING toward goals and increased independence of mobility this treatment session  Discharge Recommendations: Home with assist PRN, Home with Home health PT     Assessment             Sit to Stand: Contact guard assistance, Minimal Assistance(depending on seat height)  Stand to sit: Stand by assistance  Bed to Chair: Contact guard assistance, Minimal assistance        Comment: Able to maintain TTWB appropriately    WB Status: TTWB R LE   Gait with wheeled walker ~15 ftx2 with min assist        Stairs  # Steps : 3  Stairs Height: 4\"  Rails: Bilateral  Assistance: Minimal assistance, Contact guard assistance    Assessment: Patient in chair upon arrival.  Sit to stand is min x 2. Ambulates to doorway with wheeled walker. She is able to maintain TTWB on R LE well and requires min/CGA to complete. Wheeled to therapy room. Up/down 3 steps 4\" in height with min assist.  She completes steps with B handrails x 1 lap. Performs seated exercises as outlined above with fair tolerance. Wheeled back to room. Sit to stand from w/c is min assist.  Ambulates back to chair with wheeled walker and min assist.  Up in chair following with call light in reach. Safety Devices  Type of devices: Call light within reach, Gait belt, Left in chair, Nurse notified          Time In: 1005  Time Out: 4933  Timed Coded Minutes: 42  Total Treatment Time: 42    Exercises:  See Flowsheets    Plan  Cont Per Plan Of Care    Goals  Short Term Goals  Time Frame for Short term goals: 4 days - expires 21/  Short term goal 1: Educate on independent right LE isometric and ROM ex for ankle, knee and hip  Short term goal 2:  Independent transfers in/out of bed and chair to safely return home  Short term goal 3: Ambulate with wh walker TTWB RLE mod independent x 45-50 to negotiate home environment  Short term goal 4: Up/down steps with right HR and 1 Cg/min to safely enter/exit home       [id number] License Number: [id number]    Date: 1/19/2021

## 2021-01-19 NOTE — DISCHARGE SUMMARY
Hospitalist Discharge Summary    Demarco Felix  :  1980  MRN:  [id number]    Admit date:  2021  Discharge date:  21    Admitting Physician:  Gates Baumgarten, MD    Discharge Diagnoses:   S/P right femur IM nail dulce insertion on 20 secondary to closed right hip fracture. Iron deficiency anemia. Alcohol use. Admission Condition:  poor      Discharged Condition:  fair    Hospital Course: The patient is a 42 y.o. female who presented to the emergency room via EMS for evaluation of pain in her right hip after she fell in her bathroom. The patient states that yesterday she was celebrating her birthday with several friends. She drank more than usual amount of alcohol, including beer and hard liquor. Sometime around midnight she got up to go to bathroom and apparently the bathroom floor was wet. She slipped and fell on her side. Immediately after she developed excruciating pain in her right hip and was unable to stand up. She says the pain was sharp, 10 out of 10, worse with any attempt to move. Her friends tried to help her up but she was in pain. They called EMS and the patient was transported to the emergency room for evaluation. States that she usually drinks 2 or 3 beers per day but not more than that. She has no other medical problems and not on prescription medications. Work-up in the emergency room revealed stable vitals. She was afebrile. BMP was unremarkable. Ethanol level was elevated 223 mg/dl, CBC revealed elevated WBC count 14.7, H&H was low with hemoglobin 8.3, hematocrit 27.4, MCV was 64.4. Platelet count was 952. Urinalysis is unremarkable. Chest x-ray revealed no acute cardiopulmonary process. COVID-19 was negative. X-ray of the righthip with pelvis revealed displaced angulated intertrochanteric right hip fracture. The patient was treated in the emergency room with fentanyl.   Orthopedic surgeon Dr. Luis Erickson was contacted and the patient was admitted ferrous sulfate (IRON 325) 325 (65 Fe) MG tablet  Take 1 tablet by mouth 2 times daily (with meals)             oxyCODONE-acetaminophen (PERCOCET) 5-325 MG per tablet  Take 1 tablet by mouth every 6 hours as needed for Pain for up to 7 days. Intended supply: 7 days. Take lowest dose possible to manage pain             sennosides-docusate sodium (SENOKOT-S) 8.6-50 MG tablet  Take 1 tablet by mouth 2 times daily                 Consults:  Dr. Kelly Mckay (Orthopedic Surgery), , Skilled care coordinator. Significant Diagnostic Studies:  Labs, CXR, Xray hips / pelvis, UA    Treatments:   2 units PRBC's, banana bag, Thiamine, Folic acid, Hip repair, Incentive Spirometry, pain medication. Disposition:   Swing bed. Discharge Instructions: Take Percocet 5/325:  1 tablet every 6 hours as needed for pain. Take Iron sulfate:  325 mg by mouth two times a day. Senokot two times a day to prevent constipation. Follow up with Dr. Kelly Mckay as directed by him at discharge. Out patient PT to be set up at discharge. Rx for wheeled walker given at discharge. Labs on Friday (CBC). Activity:  As directed by Dr. Kelly Mckay. Diet:  General.    Follow up with Charisse Travis MD in 2 weeks. Discharge time =  32 minutes.      Signed:  Daniel Back  1/20/2021, 5:57 AM

## 2021-01-19 NOTE — PROGRESS NOTES
Department of Orthopedic Surgery  MIGUEL Maldonado Progress Note      SUBJECTIVE  Pt. Is post-op day # 1 from right IM nail. She reports pain to her hip with movement but is controlled with medication. She states she was able to get up to ambulate with a walker today with physical therapy. She denies fevers, chills, nausea, vomiting, calf pain, or shortness of breath. OBJECTIVE  BP (!) 141/86   Pulse 102   Temp 98.4 °F (36.9 °C) (Oral)   Resp 16   Ht 5' 7\" (1.702 m)   Wt 260 lb 11.2 oz (118.3 kg)   LMP 12/21/2020 Comment: pt denies any chance of pregnancy  SpO2 96%   BMI 40.83 kg/m²     Physical    Vitals WNL  Wound C/D/I  Mild redness and swelling  Calf Supple  DNVI    LABS   1/18/2021  5:09 AM - [facility] Incoming Lab Results From [facility]    Component Value Ref Range & Units Status Collected Lab   WBC 13. 6High   3.5 - 11.0 k/uL Final 01/18/2021  4:50 AM [facility]   RBC 4.28  4.0 - 5.2 m/uL Final 01/18/2021  4:50 AM [facility]   Hemoglobin 9.3Low   12.0 - 16.0 g/dL Final 01/18/2021  4:50 AM [facility]   Hematocrit 29.8Low   36 - 46 % Final 01/18/2021  4:50 AM [facility]   MCV 69.6Low   80 - 100 fL Final 01/18/2021  4:50 AM [address] [facility]   [facility] 21.6Low   26 - 34 pg Final 01/18/2021  4:50 AM [facility]   MCHC 31.1  31 - 37 g/dL Final 01/18/2021  4:50 AM [facility]   RDW 20.9High   12.1 - 15.2 % Final 01/18/2021  4:50 AM [facility]   Platelets 939  823 - 450 k/uL Final 01/18/2021  4:50 AM [address] [facility]   MPV NOT REPORTED  6.0 - 12.0 fL Final 01/18/2021  4:50 AM - [facility]   NRBC Automated NOT REPORTED  per 100 WBC Final 01/18/2021  4:50 AM [address] [facility]      Patient is deciding if she wants to be discharged home or stay in swing bed. She is doing well getting up with assistance. Pain is controlled.   Will re-check HG tomorrow. Will continue to follow.

## 2021-01-19 NOTE — PROGRESS NOTES
Hospitalist Progress Note  1/19/2021 5:43 AM  Subjective:   Admit Date: 1/17/2021  PCP: Charisse Travis MD    Interval History: Garrison Garcia states she is doing well. She denies pain and the pain medication is working well. No chest pain or SOB. Nausea yesterday morning but she ate well in the afternoon. Bowels moving and she denies any trouble urinating. She states she has talked with her family and feels it would be better for her to stay in swing bed. Diet: Dietary Nutrition Supplements: Standard High Calorie Oral Supplement  DIET GENERAL;  Medications:   Scheduled Meds:   ferrous sulfate  325 mg Oral BID WC    sodium chloride flush  10 mL Intravenous 2 times per day    pantoprazole  40 mg Oral QAM AC    folic acid  1 mg Oral Daily    thiamine  100 mg Oral Daily    enoxaparin  40 mg Subcutaneous Daily    sennosides-docusate sodium  1 tablet Oral BID     Continuous Infusions:    Patient's current medications documented, reviewed, and updated. CBC:   Recent Labs     01/17/21  0158 01/17/21  0158 01/17/21  2140 01/18/21  0450 01/19/21  0500   WBC 14.7*  --   --  13.6* 8.9   HGB 8.3*   < > 10.0* 9.3* 8.5*     --   --  208 189    < > = values in this interval not displayed. BMP:    Recent Labs     01/17/21  0158 01/18/21  0450    137   K 3.9 4.1    107   CO2 21 23   BUN 10 7   CREATININE 0.69 0.56   GLUCOSE 116* 144*     Hepatic: No results for input(s): AST, ALT, ALB, BILITOT, ALKPHOS in the last 72 hours. Troponin: No results for input(s): TROPONINI in the last 72 hours. BNP: No results for input(s): BNP in the last 72 hours. Lipids: No results for input(s): CHOL, HDL in the last 72 hours. Invalid input(s): LDLCALCU  INR: No results for input(s): INR in the last 72 hours.       Objective:   Vitals: /83   Pulse 89   Temp 98.1 °F (36.7 °C) (Oral)   Resp 16   Ht 5' 7\" (1.702 m)   Wt 256 lb (116.1 kg)   LMP 12/21/2020 Comment: pt denies any chance of pregnancy  SpO2 98%   BMI 40.10 kg/m²   General appearance: alert and cooperative with exam  HEENT: Head: Normocephalic, no lesions, without obvious abnormality. Eye: Normal external eye, conjunctiva, lids cornea, MARYANNE. Nose: Normal external nose, mucus membranes and septum. Neck: no adenopathy, no carotid bruit and supple, symmetrical, trachea midline  Lungs: clear to auscultation bilaterally  Heart: regular rate and rhythm, S1, S2 normal, no murmur, click, rub or gallop  Abdomen: soft, non-tender; bowel sounds normal; no masses,  no organomegaly and obese. Extremities: Post op hip, SCD's on, no calf tenderness. Neurologic: Mental status: Alert, oriented, thought content appropriate    Assessment and Plan:   1. Closed right hip fracture - S/P right femur IM nail dulce insertion by Dr. Dede Curling on 1/17. Doing well. 2.  Iron deficiency anemia - S/P 2 units of PRBC's before surgery. Started on iron replacement. Should have further work up as an out patient. 3.  Alcohol use - Patient denies withdrawal symptoms in the past if she doesn't drink. Banana bag given upon admission with Thiamine and Folate ordered daily. 4.  Obesity. Plan:  1. With drop in Hgb, will recheck labs in am.  2.  Consult skilled care coordinator to get approved for swing bed. Patient continues to require in patient admission secondary to working with therapy and monitoring post op anemia. DVT prophylaxis:   [x] Lovenox   [x] SCDs   [] SQ Heparin   [] Encourage ambulation, low risk for DVT, no chemical or mechanical    prophylaxis necessary      [] Already on Anticoagulation    Patient Active Problem List:     Closed fracture of right hip ([address]     Closed hip fracture, right, initial encounter [facility])        [address]    (x)  I confirmed that the patient's Advanced Care Plan is present, code status documented, or surrogate decision maker is listed in the patient's medical record.   ( )  The patient's advanced care plan is not present because:  (select)   ( ) I confirmed today that the patient does not wish or was not able to name a surrogate decision maker or provide an [address] ( ) Hospice care is currently being provided or has been provided this calender year. ( )  I did not confirm today the presence of an [address] or surrogate decision maker documented within the patient's medical record. (Does not satisfy MIPS performance). Documentation of Current Medications in the Medical Record    (x)  I have utilized all available immediate resources to obtain, update, or review the patient's current medications. If Yes, Stop Here  ( ) The patient is not eligivel for medications reconciliation; the patient is in an emergent medical situation where delaying treatment would jeopardize the patient's health. ( ) I did not confirm, update or review the patient's current list of medications today.   (does not satisfy MIPS performance)      Evgeny Kraus MD  [facility]

## 2021-01-19 NOTE — PROGRESS NOTES
[facility]LE[facility]  Occupational Therapy  Evaluation  Date: 2021  Patient Name: Galdino Templeton        MRN: [id number]    : 1980  (42 y.o.)  Gender: female   Referring Practitioner: Dr. Dayne Rios  Diagnosis: Right hip fracture with ORIF  Additional Pertinent Hx: Patient fell in bathroom sustaining right intertrochanteric fracture and underwent ORIF 21. Referred to PT for TTWB mobility  History reviewed. No pertinent past medical history.   Past Surgical History:   Procedure Laterality Date     SECTION                  Subjective  Subjective: Pt in chair upon arrival  Pain Level: 0  Pain Location: Hip  Pain Orientation: Right     Vision  Vision: Within Functional Limits  Hearing  Hearing: Within functional limits  Social/Functional History  Lives With: Family  Type of Home: House  Home Layout: Two level, Able to Live on Main level with bedroom/bathroom  Home Access: Stairs to enter with rails  Entrance Stairs - Number of Steps: 3  Entrance Stairs - Rails: Right  Bathroom Shower/Tub: Tub/Shower unit  Bathroom Toilet: Standard  ADL Assistance: Independent  Homemaking Assistance: Independent  Homemaking Responsibilities: Yes  Ambulation Assistance: Independent  Transfer Assistance: Independent  Active : Yes  Occupation: Full time employment  Type of occupation: [employer] @ [employer]  Prior Function  ADL Assistance: Independent  Homemaking Assistance: Independent  Ambulation Assistance: Independent  Transfer Assistance: Independent    Objective      ADL  Equipment Provided: Reacher, Sock aid  Feeding: Independent  Grooming: Independent  UE Bathing: Independent  LE Bathing: Minimal assistance, Contact guard assistance  UE Dressing: Independent  LE Dressing: Contact guard assistance, Minimal assistance  Toileting: Contact guard assistance           Balance  Sitting Balance: Independent  Standing Balance: Contact guard assistance       Functional Mobility  Functional - Mobility Device: Rolling Walker  Activity: To/from bathroom  Assist Level: Contact guard assistance         Assessment: OT evaluation completed. Pt demonstrates the above deficits  and would benefit from short tern OT intervention to assess independence w/ self care tasks prior to d/c home. Pt currently requries Mod A for STS from low surfaces d/t pain and requries Min A for LB dressing/bathing tasks. Educated pt on use of AE (reacher & sock aide) for donning/doffing socks & underwear this date. Demo's CGA for don/doff FITZ socks & for donning underwear. Requires Min A/CGA for standing balance when pulling underwear up over hips. Ambulates into BR w/ CGA & FWW to for toileting & completes toileting w/ CGA. Pt left w/ PTA at conclusion of session. Pt states she is discharging home Wednesday evening. Plan to complete full bathe & dress session tomorrow morning prior to d/c to make sure pt is safe w/ all ADLs.      Goals  Short term goals  Time Frame for Short term goals: 3 days (1/21/2021)  Short term goal 1: Pt to be Mod I w/ LB bathing & dressing taks w/ use of AE as needed  Short term goal 2: Pt to be Mod I w/ toileting tasks w/ use of AE as needed  Short term goal 3: Pt to tolerate static/dynamic standing activity x 5-8 min in order to complete self care tasks  Short term goal 4: Pt to be educated on AE/EC/WS strategies to minimize fatigue & maximize independence w/ self care tasks    Plan  REQUIRES OT FOLLOW UP: Yes    Times per week: 3x  Times per day: Daily(1-2x daily)    Timed Code Treatment Minutes: 14 Minutes       Time In: 1250  Time Out: 7046  Timed Coded Minutes: 14  Total Treatment Time: 7[address] [facility] SALBADOR Medeiros,OTR/L  1/19/2021

## 2021-01-19 NOTE — PLAN OF CARE
[facility]    Inpatient Occupational Therapy  Plan of Care  OT Orders Received and Evaluation Complete  Date: 2021  Patient Name: Pawan Mcdonough        MRN: [id number]    : 1980  (42 y.o.)  Referring Practitioner: Dr. Heide Boone  Onset Date: 21  Referral Date: 2021  Diagnosis: Right hip fracture with ORIF  Treatment Diagnosis: Right hip fracture with ORIF    Identified Problem Areas  Performance deficits / Impairments: Decreased functional mobility , Decreased endurance, Decreased ADL status, Decreased balance, Decreased high-level IADLs  Assessment: OT evaluation completed. Pt demonstrates the above deficits  and would benefit from short tern OT intervention to assess independence w/ self care tasks prior to d/c home. Pt currently requries Mod A for STS from low surfaces d/t pain and requries Min A for LB dressing/bathing tasks. Educated pt on use of AE (reacher & sock aide) for donning/doffing socks & underwear this date. Demo's CGA for don/doff FITZ socks & for donning underwear. Requires Min A/CGA for standing balance when pulling underwear up over hips. Ambulates into BR w/ CGA & FWW to for toileting & completes toileting w/ CGA. Pt left w/ PTA at conclusion of session. Pt states she is discharging home Wednesday evening. Plan to complete full bathe & dress session tomorrow morning prior to d/c to make sure pt is safe w/ all ADLs.   Prognosis: Good     Justification for Skilled Services:  [x]  Complete Daily Tasks Safely  [x] Improve Balance   [x]  Return to Prior Level of Function  [x]  Family/Caregiver Education    [x] Improve UE strength  [x] Patient Education: [x]Adaptive Equipment   [x]Home Exercise Program and Progression    Treatment Plan  Plan  Times per week: 3x  Times per day: Daily(1-2x daily)  Plan weeks: 3 days  [] Modalities:  [x] Therapeutic Exercise   [x] Therapeutic Activity  [] Splinting:     [] Home Safety Evaluation         [x] ADL Retraining                       [] Muscle Re-education [] Cognitive Retraining            [] Sensory Integration  [x] Patient Education [x] Home Exercise Program [x] Fine Motor Coordination  Discharge Recommendations: Continue to assess pending progress, Home with assist PRN    GOALS  Short term goals  Time Frame for Short term goals: 3 days (1/21/2021)  Short term goal 1: Pt to be Mod I w/ LB bathing & dressing taks w/ use of AE as needed  Short term goal 2: Pt to be Mod I w/ toileting tasks w/ use of AE as needed  Short term goal 3: Pt to tolerate static/dynamic standing activity x 5-8 min in order to complete self care tasks  Short term goal 4: Pt to be educated on AE/EC/WS strategies to minimize fatigue & maximize independence w/ self care tasks    Upon Swingbed Transfer this Plan of Care will be followed until revision is completed.     SALBADOR Cintron,OTR/L                    Date: 1/19/2021

## 2021-01-19 NOTE — PROGRESS NOTES
Phone: Cindy  Date: 2021  Fax: [phone]      Physical Therapy    Daily Note    Patient Name: Chris Lara      : 1980  (42 y.o.)  MRN: [id number]     Pt is PROGRESSING toward goals and increased independence of mobility this treatment session  Discharge Recommendations: Home with assist PRN, Home with Home health PT     Assessment           Sit to Supine: Minimal assistance(from L side of bed)    Sit to Stand: Contact guard assistance, Minimal Assistance(depends on seat height)  Stand to sit: Stand by assistance        Comment: Able to maintain TTWB appropriately    WB Status: TTWB R LE  Ambulation 1  Surface: level tile  Device: Rolling Walker  Assistance: Minimal assistance  Quality of Gait: fair and steady  Distance: 15 ftx2        Stairs  # Steps : 3  Stairs Height: 4\"  Rails: Bilateral  Assistance: Minimal assistance    Assessment: Patient in bathroom with OT upon arrival to room. When she is finshed, she ambulates from bathroom to w/c in hallway with wheeled walker. Wheeled to therapy room and is able to complete exercises as outlined above with good tolerance. Up/down steps with B handrails 4\" in height with min assist.  Wheeled back to room following and requests to lie down. Sit to supine from L side of bed is min assist for B LE managment. In bed following with call light in reach. Safety Devices  Type of devices: Call light within reach, Gait belt, Left in bed          Time In: 1315  Time Out: 1347  Timed Coded Minutes: 32  Total Treatment Time: 32    Exercises:  See [address] Per Plan Of Care    Goals  Short Term Goals  Time Frame for Short term goals: 4 days - expires 21/  Short term goal 1: Educate on independent right LE isometric and ROM ex for ankle, knee and hip  Short term goal 2:  Independent transfers in/out of bed and chair to safely return home  Short term goal 3: Ambulate with wh walker TTWB RLE mod independent x 45-50 to negotiate home environment  Short term goal 4: Up/down steps with right HR and 1 Cg/min to safely enter/exit home       [id number] [facility] License Number: [id number]    Date: 1/19/2021

## 2021-01-19 NOTE — PROGRESS NOTES
Swing bed coordinator spoke with SW regarding if insurance would cover for pt to stay. Swing bed coordinator states that when she talked with pt this morning, pt expressed wanting to go home. SW and RN case manager spoke with pt again today regarding discharge plans. Pt states that she would like to go home tomorrow evening. SW offered to arrange home health and pt states that she has people that can help her and she would prefer to do outpatient therapy. Swing bed coordinator stated that pt had mentioned transportation assist to appointments and SW provided information regarding the [employer] to her. Pt is thankful for the information but thinks she may have someone that can bring her as well. Plan for pt to discharge to home tomorrow if medically stable with outpatient therapy services. Pt will need a FWW and prescription has been requested. NINOSKA will continue to follow.  Sven MORENOW 1/19/2021

## 2021-01-20 VITALS
HEART RATE: 91 BPM | RESPIRATION RATE: 18 BRPM | HEIGHT: 67 IN | TEMPERATURE: 98.2 F | BODY MASS INDEX: 40.18 KG/M2 | SYSTOLIC BLOOD PRESSURE: 138 MMHG | DIASTOLIC BLOOD PRESSURE: 82 MMHG | OXYGEN SATURATION: 96 % | WEIGHT: 256 LBS

## 2021-01-20 LAB
ANION GAP SERPL CALCULATED.3IONS-SCNC: 7 MMOL/L (ref 9–17)
BUN BLDV-MCNC: 10 MG/DL (ref 6–20)
BUN/CREAT BLD: 22 (ref 9–20)
CALCIUM SERPL-MCNC: 8.8 MG/DL (ref 8.6–10.4)
CHLORIDE BLD-SCNC: 105 MMOL/L (ref 98–107)
CO2: 26 MMOL/L (ref 20–31)
CREAT SERPL-MCNC: 0.46 MG/DL (ref 0.5–0.9)
GFR AFRICAN AMERICAN: >60 ML/MIN
GFR NON-AFRICAN AMERICAN: >60 ML/MIN
GFR SERPL CREATININE-BSD FRML MDRD: ABNORMAL ML/MIN/{1.73_M2}
GFR SERPL CREATININE-BSD FRML MDRD: ABNORMAL ML/MIN/{1.73_M2}
GLUCOSE BLD-MCNC: 93 MG/DL (ref 70–99)
HCT VFR BLD CALC: 26.8 % (ref 36–46)
HEMOGLOBIN: 8.3 G/DL (ref 12–16)
MCH RBC QN AUTO: 21.6 PG (ref 26–34)
MCHC RBC AUTO-ENTMCNC: 30.9 G/DL (ref 31–37)
MCV RBC AUTO: 69.9 FL (ref 80–100)
MORPHOLOGY: NORMAL
NRBC AUTOMATED: ABNORMAL PER 100 WBC
PDW BLD-RTO: 20.9 % (ref 12.1–15.2)
PLATELET # BLD: 195 K/UL (ref 140–450)
PMV BLD AUTO: ABNORMAL FL (ref 6–12)
POTASSIUM SERPL-SCNC: 3.6 MMOL/L (ref 3.7–5.3)
RBC # BLD: 3.83 M/UL (ref 4–5.2)
SODIUM BLD-SCNC: 138 MMOL/L (ref 135–144)
WBC # BLD: 9.1 K/UL (ref 3.5–11)

## 2021-01-20 PROCEDURE — 97116 GAIT TRAINING THERAPY: CPT

## 2021-01-20 PROCEDURE — 85027 COMPLETE CBC AUTOMATED: CPT

## 2021-01-20 PROCEDURE — 97110 THERAPEUTIC EXERCISES: CPT

## 2021-01-20 PROCEDURE — 80048 BASIC METABOLIC PNL TOTAL CA: CPT

## 2021-01-20 PROCEDURE — 6360000002 HC RX W HCPCS: Performed by: ORTHOPAEDIC SURGERY

## 2021-01-20 PROCEDURE — 36415 COLL VENOUS BLD VENIPUNCTURE: CPT

## 2021-01-20 PROCEDURE — 6370000000 HC RX 637 (ALT 250 FOR IP): Performed by: ORTHOPAEDIC SURGERY

## 2021-01-20 PROCEDURE — 6370000000 HC RX 637 (ALT 250 FOR IP): Performed by: INTERNAL MEDICINE

## 2021-01-20 PROCEDURE — 2580000003 HC RX 258: Performed by: ORTHOPAEDIC SURGERY

## 2021-01-20 PROCEDURE — 97535 SELF CARE MNGMENT TRAINING: CPT

## 2021-01-20 RX ORDER — SENNA AND DOCUSATE SODIUM 50; 8.6 MG/1; MG/1
1 TABLET, FILM COATED ORAL 2 TIMES DAILY
Qty: 60 TABLET | Refills: 0 | Status: SHIPPED | OUTPATIENT
Start: 2021-01-20 | End: 2021-06-10 | Stop reason: ALTCHOICE

## 2021-01-20 RX ORDER — FERROUS SULFATE 325(65) MG
325 TABLET ORAL 2 TIMES DAILY WITH MEALS
Qty: 60 TABLET | Refills: 3 | Status: SHIPPED | OUTPATIENT
Start: 2021-01-20 | End: 2021-07-08 | Stop reason: SDUPTHER

## 2021-01-20 RX ORDER — OXYCODONE HYDROCHLORIDE AND ACETAMINOPHEN 5; 325 MG/1; MG/1
1 TABLET ORAL EVERY 6 HOURS PRN
Qty: 25 TABLET | Refills: 0 | Status: SHIPPED | OUTPATIENT
Start: 2021-01-20 | End: 2021-01-27

## 2021-01-20 RX ADMIN — OXYCODONE HYDROCHLORIDE 10 MG: 5 TABLET ORAL at 01:15

## 2021-01-20 RX ADMIN — FERROUS SULFATE TAB 325 MG (65 MG ELEMENTAL FE) 325 MG: 325 (65 FE) TAB at 18:01

## 2021-01-20 RX ADMIN — DOCUSATE SODIUM 50 MG AND SENNOSIDES 8.6 MG 1 TABLET: 8.6; 5 TABLET, FILM COATED ORAL at 08:16

## 2021-01-20 RX ADMIN — FOLIC ACID 1 MG: 1 TABLET ORAL at 08:16

## 2021-01-20 RX ADMIN — FERROUS SULFATE TAB 325 MG (65 MG ELEMENTAL FE) 325 MG: 325 (65 FE) TAB at 07:50

## 2021-01-20 RX ADMIN — Medication 10 ML: at 08:20

## 2021-01-20 RX ADMIN — OXYCODONE HYDROCHLORIDE 10 MG: 5 TABLET ORAL at 13:24

## 2021-01-20 RX ADMIN — THIAMINE HCL TAB 100 MG 100 MG: 100 TAB at 08:16

## 2021-01-20 RX ADMIN — ENOXAPARIN SODIUM 40 MG: 40 INJECTION SUBCUTANEOUS at 08:16

## 2021-01-20 RX ADMIN — PANTOPRAZOLE SODIUM 40 MG: 40 TABLET, DELAYED RELEASE ORAL at 06:33

## 2021-01-20 RX ADMIN — OXYCODONE HYDROCHLORIDE 10 MG: 5 TABLET ORAL at 08:16

## 2021-01-20 RX ADMIN — OXYCODONE HYDROCHLORIDE 10 MG: 5 TABLET ORAL at 18:02

## 2021-01-20 ASSESSMENT — PAIN SCALES - GENERAL
PAINLEVEL_OUTOF10: 3
PAINLEVEL_OUTOF10: 8
PAINLEVEL_OUTOF10: 6
PAINLEVEL_OUTOF10: 6
PAINLEVEL_OUTOF10: 8

## 2021-01-20 NOTE — PROGRESS NOTES
Hospitalist Progress Note  1/20/2021 5:46 AM  Subjective:   Admit Date: 1/17/2021  PCP: Josh Garibay MD    Interval History: Marquez Olson has no complaints this am.  She states she would like to go home as she has assistance at home. Her pain is controlled with pain medication. NO chest pain or SOB. Bowels did not move yesterday. She denies any trouble urinating. Appetite is \"so so\", no nausea. Diet: Dietary Nutrition Supplements: Standard High Calorie Oral Supplement  DIET GENERAL;  Medications:   Scheduled Meds:   ferrous sulfate  325 mg Oral BID WC    sodium chloride flush  10 mL Intravenous 2 times per day    pantoprazole  40 mg Oral QAM AC    folic acid  1 mg Oral Daily    thiamine  100 mg Oral Daily    enoxaparin  40 mg Subcutaneous Daily    sennosides-docusate sodium  1 tablet Oral BID     Continuous Infusions:    Patient's current medications documented, reviewed, and updated. CBC:   Recent Labs     01/18/21  0450 01/19/21  0500 01/20/21  0440   WBC 13.6* 8.9 9.1   HGB 9.3* 8.5* 8.3*    189 195     BMP:    Recent Labs     01/18/21  0450 01/20/21  0440    138   K 4.1 3.6*    105   CO2 23 26   BUN 7 10   CREATININE 0.56 0.46*   GLUCOSE 144* 93     Hepatic: No results for input(s): AST, ALT, ALB, BILITOT, ALKPHOS in the last 72 hours. Troponin: No results for input(s): TROPONINI in the last 72 hours. BNP: No results for input(s): BNP in the last 72 hours. Lipids: No results for input(s): CHOL, HDL in the last 72 hours. Invalid input(s): LDLCALCU  INR: No results for input(s): INR in the last 72 hours. Objective:   Vitals: /85   Pulse 88   Temp 98.2 °F (36.8 °C) (Oral)   Resp 18   Ht 5' 7\" (1.702 m)   Wt 256 lb (116.1 kg)   LMP 12/21/2020 Comment: pt denies any chance of pregnancy  SpO2 93%   BMI 40.10 kg/m²   General appearance: alert and cooperative with exam  HEENT: Head: Normocephalic, no lesions, without obvious abnormality.   Eye: Normal external eye, conjunctiva, lids cornea, MARYANNE. Nose: Normal external nose, mucus membranes and septum. Neck: no adenopathy, no carotid bruit and supple, symmetrical, trachea midline  Lungs: clear to auscultation bilaterally  Heart: regular rate and rhythm, S1, S2 normal, no murmur, click, rub or gallop  Abdomen: soft, non-tender; bowel sounds normal; no masses,  no organomegaly and obese. Extremities: S/P hip surgery. No calf tenderness. Neurologic: Mental status: Alert, oriented, thought content appropriate    Assessment and Plan:   1.  Closed right hip fracture - S/P right femur IM nail dulce insertion by Dr. Jennifer Cooper on 1/17.  Doing well. 2.  Iron deficiency anemia - S/P 2 units of PRBC's before surgery.  Started on iron replacement.  Should have further work up as an out patient. Hgb 8.3 this am.  3.  Alcohol use - Patient denies withdrawal symptoms in the past if she doesn't drink.  Banana bag given upon admission with Thiamine and Folate ordered daily. 4.  Obesity. Plan:  1. Discharge home today. DVT prophylaxis:   [x] Lovenox   [] SCDs   [] SQ Heparin   [] Encourage ambulation, low risk for DVT, no chemical or mechanical    prophylaxis necessary      [] Already on Anticoagulation    Patient Active Problem List:     Closed fracture of right hip ([address]     Closed hip fracture, right, initial encounter [facility])        [address]    (x)  I confirmed that the patient's Advanced Care Plan is present, code status documented, or surrogate decision maker is listed in the patient's medical record. ( )  The patient's advanced care plan is not present because:  (select)   ( ) I confirmed today that the patient does not wish or was not able to name a surrogate decision maker or provide an [address]. ( ) Hospice care is currently being provided or has been provided this calender year.   ( )  I did not confirm today the presence of an [address] or surrogate decision maker documented within the patient's medical record. (Does not satisfy MIPS performance). Documentation of Current Medications in the Medical Record    (x)  I have utilized all available immediate resources to obtain, update, or review the patient's current medications. If Yes, Stop Here  ( ) The patient is not eligivel for medications reconciliation; the patient is in an emergent medical situation where delaying treatment would jeopardize the patient's health. ( ) I did not confirm, update or review the patient's current list of medications today.   (does not satisfy [facility] performance)      Mary Vasquez MD  Special Care Hospitalist

## 2021-01-20 NOTE — PROGRESS NOTES
with right HR and 1 Cg/min to safely enter/exit home       [address] [facility] License Number: [id number]    Date: 1/20/2021

## 2021-01-20 NOTE — PROGRESS NOTES
Pt is scheduled to discharge to home this date. Order for walker, insurance card copy and face sheet sent to [facility] for pt to obtain [address] when she discharges. Pt states that she is going home about 7 pm tonight and will have her son stop and pick this up as well as a raised toilet seat with rails. Pt identifies no further discharge needs.  Linna Gilford MSW LSW 1/20/2021

## 2021-01-20 NOTE — PROGRESS NOTES
[facility]  [facility]upational Therapy  Daily Note  Date: 2021  Patient Name: Naima Curtis        MRN: [id number]    : 1980  (42 y.o.)    Subjective: Pt in chair upon arrival  Pt is PROGRESSING toward goals and independence of Self Care this treatment session  Continue to assess Pending Progress    Objective  ADL  Equipment Provided: Reacher, Sock aid  UE Bathing: Modified independent , Setup  LE Bathing: Modified independent , Setup  UE Dressing: Independent  LE Dressing: Contact guard assistance, Minimal assistance  Balance  Sitting Balance: Independent  Standing Balance: Contact guard assistance       Assessment  Assessment: Pt found in chair upon arrival. Pt agreed to shower this morning. STS w/ min A and FWW. Pt completes UE and LE bathing and dressing per noted above. Tolerates session well. Pt plans to discharge from hospital this evening. Pt left in chair with call button in hand and all needs met.    Prognosis: Good  Discharge Recommendations: Continue to assess pending progress, Home with assist PRN       Exercises:  See Flowsheets    Goals  Short Term Goals  Time Frame for Short term goals: 4 days - expires 21/ Short term goal 1: Pt to be Mod I w/ LB bathing & dressing taks w/ use of AE as needed  Short term goal 2: Pt to be Mod I w/ toileting tasks w/ use of AE as needed  Short term goal 3: Pt to tolerate static/dynamic standing activity x 5-8 min in order to complete self care tasks  Short term goal 4: Pt to be educated on AE/EC/WS strategies to minimize fatigue & maximize independence w/ self care tasks       Time In: 834  Time Out: 930  Timed Coded Minutes: 56  Total Treatment Time: 56    CATHRYN Perez/ANGÉLICA /Directly Supervised by: Von Garcia OTR/ARA  Date: 2021

## 2021-01-20 NOTE — PROGRESS NOTES
Department of Orthopedic [address] [place] [address], NP-C Progress Note      SUBJECTIVE  Patient is post-op day #2 from right hip IM nail. She is progressing well. She states she still is having pain with ambulation but at rest is comfortable. Denies any other symptoms. She does think due to her insurance they won't allow her to stay in a swing bed and she will have to go home. She reports having 2 children who can help care for her. OBJECTIVE  BP (!) 141/82   Pulse 84   Temp 97.8 °F (36.6 °C) (Oral)   Resp 18   Ht 5' 7\" (1.702 m)   Wt 256 lb (116.1 kg)   LMP 12/21/2020 Comment: pt denies any chance of pregnancy  SpO2 96%   BMI 40.10 kg/m²     Physical    VITALS WNL  Wound C/D/I  Mild redness and swelling  Calf Supple  DNVI    LABS   CBC:           Recent Labs     01/17/21  0158 01/17/21  0158 01/17/21  2140 01/18/21  0450 01/19/21  0500   WBC 14.7*  --   --  13.6* 8.9   HGB 8.3*   < > 10.0* 9.3* 8.5*     --   --  208 189    < > = values in this interval not displayed. ASSESSMENT AND PLAN       Patient is doing well. HG 8.5, vitals stable, will monitor outpatient and follow up with PCP for further work-up of anemia. Plan is for discharge tomorrow if insurance denies swing bed. Patient will call for follow up appointment in 2 weeks with Dr. Yamel Jose [phone].

## 2021-01-20 NOTE — PROGRESS NOTES
Discharge instructions given to patient with verbalized understanding. Dressing on right hip and upper thigh changed for a large amount of serosang drainage. Incision is well approximated with staples with some dependent edema noted around incision. Padded tegaderm applied to long and short incision on right hip and upper thigh. Pt tolerated without difficulty.

## 2021-01-20 NOTE — PROGRESS NOTES
Phone: Cindy  Date: 2021  Fax: [phone]      Physical Therapy    Daily Note    Patient Name: Chyna Theodore      : 1980  (42 y.o.)  MRN: [id number]     Pt is PROGRESSING toward goals and increased independence of mobility this treatment session  Discharge Recommendations: Home with assist PRN, Home with Home health PT     Assessment             Sit to Stand: Contact guard assistance, Stand by assistance  Stand to sit: Stand by assistance, Supervision        Comment: Able to maintain TTWB appropriately    WB Status: TTWB R LE  Ambulation 1  Surface: level tile  Device: Rolling Walker  Assistance: Minimal assistance  Quality of Gait: fair and steady  Distance: 15 ftx2        Stairs  # Steps : 3  Stairs Height: 4\"  Rails: Bilateral  Assistance: Minimal assistance    Assessment: Patient in chair upon arrival. Sit to stand from chair is CGA. Ambulates with wheeled walker and TTWB on R LE to w/c in hallway. Wheeled to therapy room. Completes seated exercises as outlined above and is able to complete up/down steps 4\" in height with min assist.  Wheeled back to room and is able to ambulate from doorway back to bedside chair. Call light in reach following. Safety Devices  Type of devices: Call light within reach, Gait belt, Left in chair          Time In: 1007  Time Out: 1039  Timed Coded Minutes: 32  Total Treatment Time: 32    Exercises:  See Flowsheets    Plan  Cont Per Plan Of Care    Goals  Short Term Goals  Time Frame for Short term goals: 4 days - expires 21/  Short term goal 1: Educate on independent right LE isometric and ROM ex for ankle, knee and hip  Short term goal 2:  Independent transfers in/out of bed and chair to safely return home  Short term goal 3: Ambulate with wh walker TTWB RLE mod independent x 45-50 to negotiate home environment  Short term goal 4: Up/down steps with right HR and 1 Cg/min to safely enter/exit home       Long Term Goals [address] Therapy License Number: [id number]    Date: 1/20/2021

## 2021-01-29 ENCOUNTER — HOSPITAL ENCOUNTER (OUTPATIENT)
Dept: GENERAL RADIOLOGY | Age: 41
Discharge: HOME | End: 2021-01-31
Payer: COMMERCIAL

## 2021-01-29 ENCOUNTER — HOSPITAL ENCOUNTER (OUTPATIENT)
Age: 41
Discharge: HOME | End: 2021-01-31
Payer: COMMERCIAL

## 2021-01-29 DIAGNOSIS — T14.8XXA FRACTURE: ICD-10-CM

## 2021-01-29 PROCEDURE — 73502 X-RAY EXAM HIP UNI 2-3 VIEWS: CPT

## 2021-02-01 ENCOUNTER — TELEPHONE (OUTPATIENT)
Dept: FAMILY MEDICINE CLINIC | Age: 41
End: 2021-02-01

## 2021-02-01 NOTE — TELEPHONE ENCOUNTER
Health Maintenance   Topic Date Due    Hepatitis C screen  1980    Pneumococcal 0-64 years Vaccine (1 of 1 - PPSV23) 01/17/1985    HIV screen  01/17/1994    DTaP/Tdap/Td vaccine (1 - Tdap) 01/17/1998    Cervical cancer screen  01/17/2000    Lipid screen  01/17/2019    Flu vaccine (1) 09/01/2020    Hepatitis A vaccine  Aged Out    Hepatitis B vaccine  Aged Out    Hib vaccine  Aged Out    Meningococcal (ACWY) vaccine  Aged Out             (applicable per patient's age: Cancer Screenings, Depression Screening, Fall Risk Screening, Immunizations)    BUN (mg/dL)   Date Value   01/20/2021 10      (goal A1C is < 7)   (goal LDL is <100) need 30-50% reduction from baseline     BP Readings from Last 3 Encounters:   01/20/21 138/82   01/17/21 116/78   06/02/19 (!) 160/78    (goal /80)      All Future Testing planned in [facility]:  Lab Frequency Next Occurrence   CBC Once 01/22/2021   Hemoglobin and Hematocrit, Blood, Post Transfusion POST TRANSFUSION        Next Visit Date:  Future Appointments   Date Time Provider Cathryn Bella   2/8/2021 10:00 AM Carlos Anderson PT [facility] PT [facility]            Patient Active Problem List:     Closed fracture of right hip ([address]     Closed hip fracture, right, initial encounter ([address]

## 2021-02-01 NOTE — TELEPHONE ENCOUNTER
Shivani Aguirre from [employer] contacted the office today in regards to patient care. The patient was scheduled for this afternoon, however she canceled the appointment. Shivani Aguirre would like to be contacted should the patient have any questions/concerns following the initial visit.

## 2021-02-26 ENCOUNTER — HOSPITAL ENCOUNTER (OUTPATIENT)
Age: 41
Discharge: HOME | DRG: 301 | End: 2021-02-28
Payer: COMMERCIAL

## 2021-02-26 ENCOUNTER — HOSPITAL ENCOUNTER (OUTPATIENT)
Dept: VASCULAR LAB | Age: 41
Discharge: HOME | DRG: 301 | End: 2021-02-28
Payer: COMMERCIAL

## 2021-02-26 ENCOUNTER — HOSPITAL ENCOUNTER (INPATIENT)
Age: 41
LOS: 3 days | Discharge: HOME | DRG: 301 | End: 2021-03-01
Attending: INTERNAL MEDICINE | Admitting: INTERNAL MEDICINE
Payer: COMMERCIAL

## 2021-02-26 ENCOUNTER — HOSPITAL ENCOUNTER (OUTPATIENT)
Dept: GENERAL RADIOLOGY | Age: 41
Discharge: HOME | DRG: 301 | End: 2021-02-28
Payer: COMMERCIAL

## 2021-02-26 ENCOUNTER — APPOINTMENT (OUTPATIENT)
Dept: CT IMAGING | Age: 41
DRG: 301 | End: 2021-02-26
Payer: COMMERCIAL

## 2021-02-26 DIAGNOSIS — M79.604 RIGHT LEG PAIN: ICD-10-CM

## 2021-02-26 DIAGNOSIS — S72.144S CLOSED NONDISPLACED INTERTROCHANTERIC FRACTURE OF RIGHT FEMUR, SEQUELA: ICD-10-CM

## 2021-02-26 DIAGNOSIS — I82.401 LEG DVT (DEEP VENOUS THROMBOEMBOLISM), ACUTE, RIGHT (HCC): Primary | ICD-10-CM

## 2021-02-26 PROBLEM — I82.5Z9 CHRONIC DEEP VEIN THROMBOSIS (DVT) OF LOWER LEG (HCC): Status: ACTIVE | Noted: 2021-02-26

## 2021-02-26 PROBLEM — I82.4Z1 ACUTE DEEP VEIN THROMBOSIS (DVT) OF DISTAL VEIN OF RIGHT LOWER EXTREMITY (HCC): Status: ACTIVE | Noted: 2021-02-26

## 2021-02-26 LAB
ABSOLUTE EOS #: 0.1 K/UL (ref 0–0.4)
ABSOLUTE IMMATURE GRANULOCYTE: ABNORMAL K/UL (ref 0–0.3)
ABSOLUTE LYMPH #: 1.2 K/UL (ref 1–4.8)
ABSOLUTE MONO #: 0.3 K/UL (ref 0–1)
ALBUMIN SERPL-MCNC: 3.9 G/DL (ref 3.5–5.2)
ALBUMIN/GLOBULIN RATIO: ABNORMAL (ref 1–2.5)
ALP BLD-CCNC: 148 U/L (ref 35–104)
ALT SERPL-CCNC: 13 U/L (ref 5–33)
ANION GAP SERPL CALCULATED.3IONS-SCNC: 11 MMOL/L (ref 9–17)
AST SERPL-CCNC: 18 U/L
BASOPHILS # BLD: 0 % (ref 0–2)
BASOPHILS ABSOLUTE: 0 K/UL (ref 0–0.2)
BILIRUB SERPL-MCNC: 0.2 MG/DL (ref 0.3–1.2)
BUN BLDV-MCNC: 7 MG/DL (ref 6–20)
BUN/CREAT BLD: 13 (ref 9–20)
CALCIUM SERPL-MCNC: 9.6 MG/DL (ref 8.6–10.4)
CHLORIDE BLD-SCNC: 103 MMOL/L (ref 98–107)
CO2: 24 MMOL/L (ref 20–31)
CREAT SERPL-MCNC: 0.56 MG/DL (ref 0.5–0.9)
DIFFERENTIAL TYPE: ABNORMAL
EOSINOPHILS RELATIVE PERCENT: 2 % (ref 0–5)
GFR AFRICAN AMERICAN: >60 ML/MIN
GFR NON-AFRICAN AMERICAN: >60 ML/MIN
GFR SERPL CREATININE-BSD FRML MDRD: ABNORMAL ML/MIN/{1.73_M2}
GFR SERPL CREATININE-BSD FRML MDRD: ABNORMAL ML/MIN/{1.73_M2}
GLUCOSE BLD-MCNC: 119 MG/DL (ref 70–99)
HCG QUALITATIVE: NEGATIVE
HCT VFR BLD CALC: 33.3 % (ref 36–46)
HCT VFR BLD CALC: 35.5 % (ref 36–46)
HEMOGLOBIN: 10.6 G/DL (ref 12–16)
HEMOGLOBIN: 11.4 G/DL (ref 12–16)
IMMATURE GRANULOCYTES: ABNORMAL %
INR BLD: 1
LYMPHOCYTES # BLD: 16 % (ref 15–40)
MCH RBC QN AUTO: 23.9 PG (ref 26–34)
MCH RBC QN AUTO: 23.9 PG (ref 26–34)
MCHC RBC AUTO-ENTMCNC: 31.7 G/DL (ref 31–37)
MCHC RBC AUTO-ENTMCNC: 32.1 G/DL (ref 31–37)
MCV RBC AUTO: 74.4 FL (ref 80–100)
MCV RBC AUTO: 75.2 FL (ref 80–100)
MONOCYTES # BLD: 4 % (ref 4–8)
MORPHOLOGY: ABNORMAL
NRBC AUTOMATED: ABNORMAL PER 100 WBC
NRBC AUTOMATED: ABNORMAL PER 100 WBC
PARTIAL THROMBOPLASTIN TIME: 30.3 SEC (ref 23.9–33.8)
PARTIAL THROMBOPLASTIN TIME: >150 SEC (ref 23.9–33.8)
PDW BLD-RTO: 24.2 % (ref 12.1–15.2)
PDW BLD-RTO: 24.3 % (ref 12.1–15.2)
PLATELET # BLD: 271 K/UL (ref 140–450)
PLATELET # BLD: 285 K/UL (ref 140–450)
PLATELET ESTIMATE: ABNORMAL
PMV BLD AUTO: ABNORMAL FL (ref 6–12)
PMV BLD AUTO: ABNORMAL FL (ref 6–12)
POTASSIUM SERPL-SCNC: 3.6 MMOL/L (ref 3.7–5.3)
PROTHROMBIN TIME: 12.7 SEC (ref 11.5–14.2)
RBC # BLD: 4.43 M/UL (ref 4–5.2)
RBC # BLD: 4.78 M/UL (ref 4–5.2)
RBC # BLD: ABNORMAL 10*6/UL
SARS-COV-2, RAPID: NOT DETECTED
SEG NEUTROPHILS: 78 % (ref 47–75)
SEGMENTED NEUTROPHILS ABSOLUTE COUNT: 5.9 K/UL (ref 2.5–7)
SODIUM BLD-SCNC: 138 MMOL/L (ref 135–144)
SPECIMEN DESCRIPTION: NORMAL
TOTAL PROTEIN: 7.3 G/DL (ref 6.4–8.3)
WBC # BLD: 7.6 K/UL (ref 3.5–11)
WBC # BLD: 8.3 K/UL (ref 3.5–11)
WBC # BLD: ABNORMAL 10*3/UL

## 2021-02-26 PROCEDURE — 6360000002 HC RX W HCPCS: Performed by: INTERNAL MEDICINE

## 2021-02-26 PROCEDURE — 94761 N-INVAS EAR/PLS OXIMETRY MLT: CPT

## 2021-02-26 PROCEDURE — 6370000000 HC RX 637 (ALT 250 FOR IP): Performed by: INTERNAL MEDICINE

## 2021-02-26 PROCEDURE — 2580000003 HC RX 258: Performed by: INTERNAL MEDICINE

## 2021-02-26 PROCEDURE — 99284 EMERGENCY DEPT VISIT MOD MDM: CPT

## 2021-02-26 PROCEDURE — 85025 COMPLETE CBC W/AUTO DIFF WBC: CPT

## 2021-02-26 PROCEDURE — 36415 COLL VENOUS BLD VENIPUNCTURE: CPT

## 2021-02-26 PROCEDURE — 6360000004 HC RX CONTRAST MEDICATION: Performed by: INTERNAL MEDICINE

## 2021-02-26 PROCEDURE — 93970 EXTREMITY STUDY: CPT

## 2021-02-26 PROCEDURE — C9803 HOPD COVID-19 SPEC COLLECT: HCPCS

## 2021-02-26 PROCEDURE — 85027 COMPLETE CBC AUTOMATED: CPT

## 2021-02-26 PROCEDURE — 80053 COMPREHEN METABOLIC PANEL: CPT

## 2021-02-26 PROCEDURE — 85610 PROTHROMBIN TIME: CPT

## 2021-02-26 PROCEDURE — 84703 CHORIONIC GONADOTROPIN ASSAY: CPT

## 2021-02-26 PROCEDURE — 71275 CT ANGIOGRAPHY CHEST: CPT

## 2021-02-26 PROCEDURE — 73502 X-RAY EXAM HIP UNI 2-3 VIEWS: CPT

## 2021-02-26 PROCEDURE — U0002 COVID-19 LAB TEST NON-CDC: HCPCS

## 2021-02-26 PROCEDURE — 85730 THROMBOPLASTIN TIME PARTIAL: CPT

## 2021-02-26 PROCEDURE — 1200000000 HC SEMI PRIVATE

## 2021-02-26 PROCEDURE — 96374 THER/PROPH/DIAG INJ IV PUSH: CPT

## 2021-02-26 RX ORDER — ONDANSETRON 2 MG/ML
4 INJECTION INTRAMUSCULAR; INTRAVENOUS EVERY 6 HOURS PRN
Status: DISCONTINUED | OUTPATIENT
Start: 2021-02-26 | End: 2021-03-01 | Stop reason: HOSPADM

## 2021-02-26 RX ORDER — POTASSIUM CHLORIDE 750 MG/1
40 TABLET, FILM COATED, EXTENDED RELEASE ORAL ONCE
Status: COMPLETED | OUTPATIENT
Start: 2021-02-26 | End: 2021-02-26

## 2021-02-26 RX ORDER — FERROUS SULFATE 325(65) MG
325 TABLET ORAL 2 TIMES DAILY WITH MEALS
Status: DISCONTINUED | OUTPATIENT
Start: 2021-02-26 | End: 2021-03-01 | Stop reason: HOSPADM

## 2021-02-26 RX ORDER — HEPARIN SODIUM 10000 [USP'U]/100ML
18 INJECTION, SOLUTION INTRAVENOUS CONTINUOUS
Status: DISPENSED | OUTPATIENT
Start: 2021-02-26 | End: 2021-03-01

## 2021-02-26 RX ORDER — HEPARIN SODIUM 1000 [USP'U]/ML
80 INJECTION, SOLUTION INTRAVENOUS; SUBCUTANEOUS ONCE
Status: COMPLETED | OUTPATIENT
Start: 2021-02-26 | End: 2021-02-26

## 2021-02-26 RX ORDER — SODIUM CHLORIDE 0.9 % (FLUSH) 0.9 %
10 SYRINGE (ML) INJECTION EVERY 12 HOURS SCHEDULED
Status: DISCONTINUED | OUTPATIENT
Start: 2021-02-26 | End: 2021-03-01 | Stop reason: HOSPADM

## 2021-02-26 RX ORDER — ACETAMINOPHEN 650 MG/1
650 SUPPOSITORY RECTAL EVERY 6 HOURS PRN
Status: DISCONTINUED | OUTPATIENT
Start: 2021-02-26 | End: 2021-03-01 | Stop reason: HOSPADM

## 2021-02-26 RX ORDER — HYDROCODONE BITARTRATE AND ACETAMINOPHEN 5; 325 MG/1; MG/1
1 TABLET ORAL EVERY 6 HOURS PRN
COMMUNITY
End: 2021-06-10 | Stop reason: ALTCHOICE

## 2021-02-26 RX ORDER — HYDROCODONE BITARTRATE AND ACETAMINOPHEN 5; 325 MG/1; MG/1
1 TABLET ORAL EVERY 6 HOURS PRN
Status: DISCONTINUED | OUTPATIENT
Start: 2021-02-26 | End: 2021-03-01 | Stop reason: HOSPADM

## 2021-02-26 RX ORDER — ACETAMINOPHEN 325 MG/1
650 TABLET ORAL EVERY 6 HOURS PRN
Status: DISCONTINUED | OUTPATIENT
Start: 2021-02-26 | End: 2021-03-01 | Stop reason: HOSPADM

## 2021-02-26 RX ORDER — SODIUM CHLORIDE 0.9 % (FLUSH) 0.9 %
10 SYRINGE (ML) INJECTION PRN
Status: DISCONTINUED | OUTPATIENT
Start: 2021-02-26 | End: 2021-03-01 | Stop reason: HOSPADM

## 2021-02-26 RX ORDER — PROMETHAZINE HYDROCHLORIDE 25 MG/1
12.5 TABLET ORAL EVERY 6 HOURS PRN
Status: DISCONTINUED | OUTPATIENT
Start: 2021-02-26 | End: 2021-03-01 | Stop reason: HOSPADM

## 2021-02-26 RX ORDER — POLYETHYLENE GLYCOL 3350 17 G/17G
17 POWDER, FOR SOLUTION ORAL DAILY PRN
Status: DISCONTINUED | OUTPATIENT
Start: 2021-02-26 | End: 2021-03-01 | Stop reason: HOSPADM

## 2021-02-26 RX ORDER — SENNA AND DOCUSATE SODIUM 50; 8.6 MG/1; MG/1
1 TABLET, FILM COATED ORAL 2 TIMES DAILY
Status: DISCONTINUED | OUTPATIENT
Start: 2021-02-26 | End: 2021-03-01 | Stop reason: HOSPADM

## 2021-02-26 RX ADMIN — HEPARIN SODIUM 9290 UNITS: 1000 INJECTION, SOLUTION INTRAVENOUS; SUBCUTANEOUS at 15:05

## 2021-02-26 RX ADMIN — HYDROCODONE BITARTRATE AND ACETAMINOPHEN 1 TABLET: 5; 325 TABLET ORAL at 22:46

## 2021-02-26 RX ADMIN — POTASSIUM CHLORIDE 40 MEQ: 750 TABLET, FILM COATED, EXTENDED RELEASE ORAL at 19:48

## 2021-02-26 RX ADMIN — FERROUS SULFATE TAB 325 MG (65 MG ELEMENTAL FE) 325 MG: 325 (65 FE) TAB at 19:48

## 2021-02-26 RX ADMIN — Medication 10 ML: at 22:41

## 2021-02-26 RX ADMIN — SENNOSIDES AND DOCUSATE SODIUM 1 TABLET: 8.6; 5 TABLET ORAL at 19:48

## 2021-02-26 RX ADMIN — HEPARIN SODIUM AND DEXTROSE 18 UNITS/KG/HR: 10000; 5 INJECTION INTRAVENOUS at 15:06

## 2021-02-26 RX ADMIN — IOPAMIDOL 100 ML: 755 INJECTION, SOLUTION INTRAVENOUS at 15:19

## 2021-02-26 NOTE — ED PROVIDER NOTES
[address]      Pt Name: Ana María Sanders  MRN: [id number]  [place] 1980  Date of evaluation: 2/26/2021  Provider: Latasha Orozco MD    CHIEF COMPLAINT       Chief Complaint   Patient presents with    Leg Swelling     right lower leg sweling, had femur fx repaired by Dr Artemio López on 1/17 last week lower legs started swelling more          HISTORY OF PRESENT ILLNESS   (Location/Symptom, Timing/Onset, Context/Setting, Quality, Duration, Modifying Factors, Severity)  Note limiting factors. Ana María Sanders is a 42 y.o. female who presents to the emergency department for evaluation and management of right lower leg DVT. She is status post femur fracture repair on 1/17/2021 by Dr. Artemio López. Postsurgical DVT prophylaxis included compression stockings but no anticoagulation. She developed increasing right lower leg swelling 5-7 days and ultrasound was performed this morning which detected extensive DVT. She is here for further evaluation and management. Her primary care provider, Dr. Ethel Ruiz suggest that she receive complete evaluation in the ER. This patient is being evaluated during the COVID-19 pandemic. HPI    Nursing Notes were reviewed. REVIEW OF SYSTEMS    (2-9 systems for level 4, 10 or more for level 5)       REVIEW OF SYSTEMS    Constitutional: Negative for fatigue and fever. Respiratory: Negative for cough, chest tightness and shortness of breath. Cardiovascular: Negative for chest pain, palpitations and leg swelling. Musculoskeletal: Positive for right lower leg swelling, negative for arthralgias, back pain and neck pain. Neurological: Negative for dizziness, speech difficulty, weakness, distal tingling/numbness and headaches. Hematological: Negative for adenopathy. Does not bruise/bleed easily. Psychiatric/Behavioral: Positive for anxiety, negative for agitation, anxiety, depression, behavioral problems, confusion, hallucinations and suicidal ideas. Except as noted above the remainder of the review of systems was reviewed and negative. PASTMEDICAL HISTORY   History reviewed. No pertinent past medical history. SURGICAL HISTORY       Past Surgical History:   Procedure Laterality Date     SECTION      FEMUR FRACTURE SURGERY Right 2021    FEMUR IM NAIL PRESLEY INSERTION performed by Chanda Saunders MD at [place] [address]       Current Discharge Medication List      CONTINUE these medications which have NOT CHANGED    Details   HYDROcodone-acetaminophen (NORCO) 5-325 MG per tablet Take 1 tablet by mouth every 6 hours as needed for Pain. ferrous sulfate (IRON 325) 325 (65 Fe) MG tablet Take 1 tablet by mouth 2 times daily (with meals)  Qty: 60 tablet, Refills: 3      sennosides-docusate sodium (SENOKOT-S) 8.6-50 MG tablet Take 1 tablet by mouth 2 times daily  Qty: 60 tablet, Refills: 0             ALLERGIES     Patient has no known allergies. FAMILY HISTORY     History reviewed. No pertinent family history. SOCIAL HISTORY       Social History     Socioeconomic History    Marital status: Single     Spouse name: None    Number of children: None    Years of education: None    Highest education level: None   Occupational History    None   Social Needs    Financial resource strain: None    Food insecurity     Worry: None     Inability: None    Transportation needs     Medical: None     Non-medical: None   Tobacco Use    Smoking status: Former Smoker     Packs/day: 0.50     Types: Cigarettes     Quit date: 2021     Years since quittin.1    Smokeless tobacco: Never Used   Substance and Sexual Activity    Alcohol use:  Yes     Alcohol/week: 2.0 standard drinks     Types: 2 Cans of beer per week    Drug use: Never  Sexual activity: None   Lifestyle    Physical activity     Days per week: None     Minutes per session: None    Stress: None   Relationships    Social connections     Talks on phone: None     Gets together: None     Attends Judaism service: None     Active member of club or organization: None     Attends meetings of clubs or organizations: None     Relationship status: None    Intimate partner violence     Fear of current or ex partner: None     Emotionally abused: None     Physically abused: None     Forced sexual activity: None   Other Topics Concern    None   Social History Narrative    None       SCREENINGS    [place] Coma Scale  Eye Opening: Spontaneous  Best Verbal Response: Oriented  Best Motor Response: Obeys commands  [place] Coma Scale Score: 15        PHYSICAL EXAM    (up to 7 for level 4, 8 or more for level 5)     ED Triage Vitals [02/26/21 1346]   BP Temp Temp src Pulse Resp SpO2 Height Weight   (!) 144/89 98.2 °F (36.8 °C) -- 115 20 98 % -- 256 lb (116.1 kg)       Physical Exam  Physical Exam   Constitutional:  Appears well, well-developed and well-nourished. No distress noted. Non toxic in appearance. HENT:     Head: Normocephalic and atraumatic. Mouth/Throat: Oropharynx is clear and mucosa moist. No oropharyngeal exudate noted. Posterior pharynx is pink and noninjected. Eyes: Conjunctivae and EOM are normal. Pupils are equal, round, and reactive to light. No scleral icterus. There is no tearing or drainage. Neck: Normal range of motion. Neck supple. No tracheal deviation present. Cardiovascular: Normal rate, regular rhythm, normal heartsounds and intact distal pulses including right dorsal pedal pulse. Capillary refill is brisk in the toes. .  Exam reveals no gallop or friction rub. No murmur heard. Pulmonary/Chest: Effort normal and breath sounds are symmetric and normal. No respiratory distress. There are no wheezes, rales or rhonchi. Abdominal: Soft. Bowel sounds are normal. No distension or no mass exhibitted. There is no tenderness, rebound, rigidity or guarding. Genitourinary:   No CVA tenderness noted on examination. Musculoskeletal: Examination of her right leg shows that there is diffuse 2+ pitting edema extending from her foot up to her lower thigh region. This is significantly swollen compared to the left. Normal range of motion. No , tenderness or deformity. Lymphadenopathy:  No cervical adenopathy. Neurological:   alert and oriented to person, place, and time. Normal speech, normal comprehension, normal cognition,  Reflexes are normal.  There are no cranial nerve deficits. Normal muscle tone, motor and sensory function including SILT exhibited. Coordination normal and gait normal.   Skin: Skin is warm and dry. No rash noted. No diaphoresis. No erythema. No pallor. Psychiatric: Pleasant and cooperative. Normal mood and affect. Behavior is  normal. Judgment and thought content normal.     DIAGNOSTIC RESULTS     EKG: All EKG's are interpreted by the Emergency Department Physician who either signs or Co-signs this chart in the absence of a cardiologist.    Not indicated. RADIOLOGY:   Non-plain film images such as CT, Ultrasoundand MRI are read by the radiologist. Plain radiographic images are visualized and preliminarily interpreted by the emergency physician with the below findings:    Not indicated. Interpretation per the Radiologist below, if available at the time of this note:    CTA CHEST W CONTRAST   Final Result      Large hiatal hernia. Negative for pulmonary embolus through the segmental    pulmonary arteries. Respiratory motion artifact obscures the subsegmental    arteries.                ED BEDSIDE ULTRASOUND:   Performed by ED Physician - none    LABS:  Labs Reviewed   CBC WITH AUTO DIFFERENTIAL - Abnormal; Notable for the following components:       Result Value    Hemoglobin 11.4 (*) Hematocrit 35.5 (*)     MCV 74.4 (*)     MCH 23.9 (*)     RDW 24.3 (*)     Seg Neutrophils 78 (*)     All other components within normal limits   COMPREHENSIVE METABOLIC PANEL W/ REFLEX TO MG FOR LOW K - Abnormal; Notable for the following components:    Glucose 119 (*)     Potassium 3.6 (*)     Alkaline Phosphatase 148 (*)     Total Bilirubin 0.20 (*)     All other components within normal limits   CBC - Abnormal; Notable for the following components:    Hemoglobin 10.6 (*)     Hematocrit 33.3 (*)     MCV 75.2 (*)     MCH 23.9 (*)     RDW 24.2 (*)     All other components within normal limits   APTT - Abnormal; Notable for the following components:    PTT >150.0 (*)     All other components within normal limits   COVID-19, RAPID   PROTIME-INR   APTT   HCG, SERUM, QUALITATIVE   BASIC METABOLIC PANEL   CBC   APTT       All other labs were within normal range or not returned as of this dictation. EMERGENCY DEPARTMENT COURSE and DIFFERENTIAL DIAGNOSIS/MDM:   Vitals:    Vitals:    02/26/21 1823 02/26/21 1837 02/26/21 1930 02/26/21 2042   BP: 136/84  128/71    Pulse: 84  82    Resp: 18  18 18   Temp: 98.3 °F (36.8 °C)  98.5 °F (36.9 °C)    TempSrc: Oral  Oral    SpO2: 98% 99% 100% 98%   Weight: 256 lb 8 oz (116.3 kg)      Height: 5' 7\" (1.702 m)          Noted    MDM    CRITICAL CARE TIME   Total Critical Care time was 0 minutes     [facility]  ED Course as of Feb 27 0011 Fri Feb 26, 2021   1403 I requested that a call be placed to Dr. Cynthia Nicolas to discuss this patient. [MS]   [phone] with Dr. Cynthia Nicolas who stated that she has not established herself as a private patient of hers. [MS]   3981 I spoke with Dr. Lacy Fofana from radiology who called to report extensive DVT in the right lower extremity extending from her lower leg to the groin region.   I explained to him that because she was borderline tachycardic and had evidence of right heart strain on EKG, that his CTA of the chest was planned to evaluate for PE. 65 Spoke with Dr. Mark Rowley who accepted patient for admission to the medical surgical service. [MS]      ED Course User Index  [MS] Vanessa Davis MD         ED Medicationsadministered this visit:    Medications   heparin 25,000 units in dextrose 5% 250 mL (premix) infusion (15 Units/kg/hr × 116.1 kg Intravenous Restarted 2/26/21 2242)   ferrous sulfate (IRON 325) tablet 325 mg (325 mg Oral Given 2/26/21 1948)   HYDROcodone-acetaminophen (NORCO) 5-325 MG per tablet 1 tablet (1 tablet Oral Given 2/26/21 2246)   sennosides-docusate sodium (SENOKOT-S) 8.6-50 MG tablet 1 tablet (1 tablet Oral Given 2/26/21 1948)   sodium chloride flush 0.9 % injection 10 mL (10 mLs Intravenous Given 2/26/21 2241)   sodium chloride flush 0.9 % injection 10 mL (has no administration in time range)   promethazine (PHENERGAN) tablet 12.5 mg (has no administration in time range)     Or   ondansetron (ZOFRAN) injection 4 mg (has no administration in time range)   polyethylene glycol (GLYCOLAX) packet 17 g (has no administration in time range)   acetaminophen (TYLENOL) tablet 650 mg (has no administration in time range)     Or   acetaminophen (TYLENOL) suppository 650 mg (has no administration in time range)   heparin (porcine) injection 9,290 Units (9,290 Units Intravenous Given 2/26/21 1505)   iopamidol (ISOVUE-370) 76 % injection 100 mL (100 mLs Intravenous Given 2/26/21 1519)   potassium chloride (KLOR-CON) extended release tablet 40 mEq (40 mEq Oral Given 2/26/21 1948)       New Prescriptions from this visit:    Current Discharge Medication List          Follow-up:  No follow-up provider specified. Final Impression:   1.  Leg DVT (deep venous thromboembolism), acute, right ([address]               (Please note that portions of this note werecompleted with a voice recognition program.  Efforts were made to edit the dictations but occasionally words are mis-transcribed.)    FINAL IMPRESSION 1. Leg DVT (deep venous thromboembolism), acute, right (HCC)          DISPOSITION/PLAN   DISPOSITION        PATIENT REFERRED TO:  No follow-up provider specified.     DISCHARGE MEDICATIONS:  Current Discharge Medication List             (Please note that portions of this note were completed with a voice recognition program.  Efforts were made to edit the dictations but occasionally words are mis-transcribed.)    Casa Kelly MD (electronically signed)  Attending Emergency Physician            Casa Kelly MD  02/27/21 64 Prateek Moraes MD  02/27/21 8927

## 2021-02-27 LAB
ANION GAP SERPL CALCULATED.3IONS-SCNC: 8 MMOL/L (ref 9–17)
BUN BLDV-MCNC: 6 MG/DL (ref 6–20)
BUN/CREAT BLD: 10 (ref 9–20)
CALCIUM SERPL-MCNC: 9.9 MG/DL (ref 8.6–10.4)
CHLORIDE BLD-SCNC: 106 MMOL/L (ref 98–107)
CO2: 25 MMOL/L (ref 20–31)
CREAT SERPL-MCNC: 0.6 MG/DL (ref 0.5–0.9)
GFR AFRICAN AMERICAN: >60 ML/MIN
GFR NON-AFRICAN AMERICAN: >60 ML/MIN
GFR SERPL CREATININE-BSD FRML MDRD: ABNORMAL ML/MIN/{1.73_M2}
GFR SERPL CREATININE-BSD FRML MDRD: ABNORMAL ML/MIN/{1.73_M2}
GLUCOSE BLD-MCNC: 96 MG/DL (ref 70–99)
HCT VFR BLD CALC: 32.8 % (ref 36–46)
HCT VFR BLD CALC: 33 % (ref 36–46)
HEMOGLOBIN: 10.4 G/DL (ref 12–16)
HEMOGLOBIN: 10.4 G/DL (ref 12–16)
MCH RBC QN AUTO: 23.7 PG (ref 26–34)
MCH RBC QN AUTO: 23.8 PG (ref 26–34)
MCHC RBC AUTO-ENTMCNC: 31.6 G/DL (ref 31–37)
MCHC RBC AUTO-ENTMCNC: 31.8 G/DL (ref 31–37)
MCV RBC AUTO: 74.6 FL (ref 80–100)
MCV RBC AUTO: 75.1 FL (ref 80–100)
MORPHOLOGY: NORMAL
NRBC AUTOMATED: ABNORMAL PER 100 WBC
NRBC AUTOMATED: ABNORMAL PER 100 WBC
PARTIAL THROMBOPLASTIN TIME: 78 SEC (ref 23.9–33.8)
PARTIAL THROMBOPLASTIN TIME: 88.9 SEC (ref 23.9–33.8)
PDW BLD-RTO: 24.4 % (ref 12.1–15.2)
PDW BLD-RTO: 25.1 % (ref 12.1–15.2)
PLATELET # BLD: 248 K/UL (ref 140–450)
PLATELET # BLD: 263 K/UL (ref 140–450)
PMV BLD AUTO: ABNORMAL FL (ref 6–12)
PMV BLD AUTO: ABNORMAL FL (ref 6–12)
POTASSIUM SERPL-SCNC: 4 MMOL/L (ref 3.7–5.3)
RBC # BLD: 4.4 M/UL (ref 4–5.2)
RBC # BLD: 4.4 M/UL (ref 4–5.2)
SODIUM BLD-SCNC: 139 MMOL/L (ref 135–144)
WBC # BLD: 5.8 K/UL (ref 3.5–11)
WBC # BLD: 6.9 K/UL (ref 3.5–11)

## 2021-02-27 PROCEDURE — 1200000000 HC SEMI PRIVATE

## 2021-02-27 PROCEDURE — 6370000000 HC RX 637 (ALT 250 FOR IP): Performed by: INTERNAL MEDICINE

## 2021-02-27 PROCEDURE — 85027 COMPLETE CBC AUTOMATED: CPT

## 2021-02-27 PROCEDURE — 85730 THROMBOPLASTIN TIME PARTIAL: CPT

## 2021-02-27 PROCEDURE — 94761 N-INVAS EAR/PLS OXIMETRY MLT: CPT

## 2021-02-27 PROCEDURE — 6360000002 HC RX W HCPCS: Performed by: INTERNAL MEDICINE

## 2021-02-27 PROCEDURE — 80048 BASIC METABOLIC PNL TOTAL CA: CPT

## 2021-02-27 PROCEDURE — 36415 COLL VENOUS BLD VENIPUNCTURE: CPT

## 2021-02-27 RX ADMIN — SENNOSIDES AND DOCUSATE SODIUM 1 TABLET: 8.6; 5 TABLET ORAL at 08:04

## 2021-02-27 RX ADMIN — HYDROCODONE BITARTRATE AND ACETAMINOPHEN 1 TABLET: 5; 325 TABLET ORAL at 04:57

## 2021-02-27 RX ADMIN — FERROUS SULFATE TAB 325 MG (65 MG ELEMENTAL FE) 325 MG: 325 (65 FE) TAB at 08:04

## 2021-02-27 RX ADMIN — SENNOSIDES AND DOCUSATE SODIUM 1 TABLET: 8.6; 5 TABLET ORAL at 20:47

## 2021-02-27 RX ADMIN — HEPARIN SODIUM AND DEXTROSE 15 UNITS/KG/HR: 10000; 5 INJECTION INTRAVENOUS at 20:45

## 2021-02-27 RX ADMIN — FERROUS SULFATE TAB 325 MG (65 MG ELEMENTAL FE) 325 MG: 325 (65 FE) TAB at 16:45

## 2021-02-27 RX ADMIN — HEPARIN SODIUM AND DEXTROSE 15 UNITS/KG/HR: 10000; 5 INJECTION INTRAVENOUS at 04:56

## 2021-02-27 RX ADMIN — HYDROCODONE BITARTRATE AND ACETAMINOPHEN 1 TABLET: 5; 325 TABLET ORAL at 16:45

## 2021-02-27 RX ADMIN — HYDROCODONE BITARTRATE AND ACETAMINOPHEN 1 TABLET: 5; 325 TABLET ORAL at 23:50

## 2021-02-27 ASSESSMENT — PAIN SCALES - GENERAL
PAINLEVEL_OUTOF10: 6
PAINLEVEL_OUTOF10: 3
PAINLEVEL_OUTOF10: 7

## 2021-02-27 NOTE — H&P
History & Physical    Patient:  Josey Yanez  YOB: 1980  Date of Service: 2/27/2021  MRN: [id number]   Acct:   [de-identified]   Primary Care Physician: Live Li MD    Chief Complaint:   Chief Complaint   Patient presents with    Leg Swelling     right lower leg sweling, had femur fx repaired by Dr Harrison Negrete on 1/17 last week lower legs started swelling more        History of Present Illness: The patient is a 42 y.o. female presented to the emergency room for evaluation of significant swelling in the right lower extremity due to extensive DVT. The patient has a history of recent fall resulting in right hip fracture, s/p repair on 1/17/2021 by Dr. Harrison Negrete. She states that she developed swelling in the right lower extremity about 7 days ago. The swelling was extending from her foot all the way to the groin area. She felt lots of pressure in her leg but no pain. She noticed that the right lower extremity was much larger compared to the left side. She was not on any anticoagulation after her surgery and was advised to use compression stocking which she did not do. She went to see Dr. Harrison Negrete yesterday and was evaluated by his nurse practitioner who ordered ultrasound of the right lower extremity. Unfortunately, it revealed very extensive DVT. The patient was tachycardic during evaluation in the emergency room and underwent CTA chest to rule out PE.  CT was negative for evidence of pulmonary embolism, revealed large hiatal hernia. Due to the extent of her DVT that places her at risk for potentially life-threatening embolization she was started on heparin drip and admitted for further management. This morning the patient has no other complaints except persisting swelling involving entire right lower extremity. She reports no chest pain or shortness of breath. Patient has no history of previous DVT/PE. She does not smoke, does not take birth control pills. Reports no history of miscarriages in the past.  Reports no family history of hypercoagulable disorders, however states her mother had DVTs later in her life but she is not sure if it was related to any medical condition. Of note, during her last hospitalization the patient was noted to be anemic with hemoglobin 8.3, low MCV 64.4. Iron studies revealed iron deficiency and she was started on ferrous sulfate on discharge. Yesterday her CBC revealed hemoglobin 11.4 and improved MCV 74.4. Patient does report history of heavy periods. Reports no history of GI problems or symptoms, no history of blood in stool or black stools. Past Medical History:    Closed right hip fracture secondary to fall  Iron deficiency anemia    Past Surgical History:        Procedure Laterality Date     SECTION      FEMUR FRACTURE SURGERY Right 2021    FEMUR IM NAIL PRESLEY INSERTION performed by Gael Aguilera MD at [address] [place] [address] Medications:   No current facility-administered medications on file prior to encounter. Current Outpatient Medications on File Prior to Encounter   Medication Sig Dispense Refill    HYDROcodone-acetaminophen (NORCO) 5-325 MG per tablet Take 1 tablet by mouth every 6 hours as needed for Pain.  ferrous sulfate (IRON 325) 325 (65 Fe) MG tablet Take 1 tablet by mouth 2 times daily (with meals) 60 tablet 3    sennosides-docusate sodium (SENOKOT-S) 8.6-50 MG tablet Take 1 tablet by mouth 2 times daily 60 tablet 0       Allergies:  Patient has no known allergies.     Social History: reports that she quit smoking about 5 weeks ago. Her smoking use included cigarettes. She smoked 0.50 packs per day. She has never used smokeless tobacco. She reports current alcohol use of about 2.0 standard drinks of alcohol per week. She reports that she does not use drugs. Family History:   Positive for heart disease, positive for chronic DVT in mother    Review of systems:  Constitutional: no fever, no night sweats, no fatigue  Head: no headache, no head injury, no migranes. Eye: no blurring of vision, no double vision. Ears: no hearing difficulty, no tinnitus  Mouth/throat: no ulceration, dental caries, dysphagia  Lungs: no cough, no shortness of breath, no wheeze  CVS: no palpitation, no chest pain, no shortness of breath  GI: no abdominal pain, no nausea , no vomiting, no constipation  SHEREE: no dysuria, frequency and urgency, no hematuria, no kidney stones  Musculoskeletal: Positive for pain in the right hip after surgery, positive for swelling in the right lower extremity  Endocrine: no polyuria, polydypsia, no cold or heat intolerence  Hematology: Positive for anemia, no easy brusing or bleeding, no hx of clotting disorder  Dermatology: no skin rash, no eczema, no prurities,  Psychiatry: no depression, no anxiety  Neurology: no syncope, no seizures, no numbness or tingling of hands, no numbness or tingling of feet, no paresis         Vitals:   Vitals:    02/27/21 0945   BP: 119/62   Pulse: 62   Resp: 18   Temp: 97.7   SpO2: 97%      BMI: Body mass index is 40.17 kg/m².     Physical Exam:  General Appearance: alert and oriented to person, place and time, in no acute distress  Cardiovascular: normal rate, regular rhythm, normal S1 and S2, no murmurs, rubs, clicks, or gallops, distal pulses intact  Pulmonary/Chest: clear to auscultation bilaterally- no wheezes, rales or rhonchi, normal air movement, no respiratory distress  Abdomen: soft, non-tender, non-distended, normal bowel sounds Extremities right lower extremity very swollen and the swelling extends from her foot all the way to the groin area, right lower extremity looks much larger compared to the left side  Skin: warm and dry, no rash or erythema  Head: normocephalic and atraumatic  Eyes: pupils equal, round, and reactive to light  Neck: supple and non-tender without mass, no thyromegaly   Musculoskeletal: normal range of motion, no joint swelling, deformity or tenderness  Neurological: alert, oriented, normal speech, no focal findings or movement disorder noted    Review of Labs and Diagnostic Testing:    Recent Results (from the past 24 hour(s))   CBC Auto Differential    Collection Time: 02/26/21  2:00 PM   Result Value Ref Range    WBC 7.6 3.5 - 11.0 k/uL    RBC 4.78 4.0 - 5.2 m/uL    Hemoglobin 11.4 (L) 12.0 - 16.0 g/dL    Hematocrit 35.5 (L) 36 - 46 %    MCV 74.4 (L) 80 - 100 fL    MCH 23.9 (L) 26 - 34 pg    MCHC 32.1 31 - 37 g/dL    RDW 24.3 (H) 12.1 - 15.2 %    Platelets 151 682 - 393 k/uL    MPV NOT REPORTED 6.0 - 12.0 fL    NRBC Automated NOT REPORTED per 100 WBC    Differential Type NOT REPORTED     Immature Granulocytes NOT REPORTED 0 %    Absolute Immature Granulocyte NOT REPORTED 0.00 - 0.30 k/uL    WBC Morphology NOT REPORTED     RBC Morphology NOT REPORTED     Platelet Estimate NOT REPORTED     Seg Neutrophils 78 (H) 47 - 75 %    Lymphocytes 16 15 - 40 %    Monocytes 4 4 - 8 %    Eosinophils % 2 0 - 5 %    Basophils 0 0 - 2 %    Segs Absolute 5.90 2.5 - 7.0 k/uL    Absolute Lymph # 1.20 1.0 - 4.8 k/uL    Absolute Mono # 0.30 0.0 - 1.0 k/uL    Absolute Eos # 0.10 0.0 - 0.4 k/uL    Basophils Absolute 0.00 0.0 - 0.2 k/uL    Morphology MODERATE ANISOCYTOSIS    Comprehensive Metabolic Panel w/ Reflex to MG    Collection Time: 02/26/21  2:00 PM   Result Value Ref Range    Glucose 119 (H) 70 - 99 mg/dL    BUN 7 6 - 20 mg/dL    CREATININE 0.56 0.50 - 0.90 mg/dL    Bun/Cre Ratio 13 9 - 20    Calcium 9.6 8.6 - 10.4 mg/dL Sodium 138 135 - 144 mmol/L    Potassium 3.6 (L) 3.7 - 5.3 mmol/L    Chloride 103 98 - 107 mmol/L    CO2 24 20 - 31 mmol/L    Anion Gap 11 9 - 17 mmol/L    Alkaline Phosphatase 148 (H) 35 - 104 U/L    ALT 13 5 - 33 U/L    AST 18 <32 U/L    Total Bilirubin 0.20 (L) 0.30 - 1.20 mg/dL    Total Protein 7.3 6.4 - 8.3 g/dL    Albumin 3.9 3.5 - 5.2 g/dL    Albumin/Globulin Ratio NOT REPORTED 1.0 - 2.5    GFR Non-African American >60 >60 mL/min    GFR African American >60 >60 mL/min    GFR Comment          GFR Staging NOT REPORTED    Protime-INR    Collection Time: 02/26/21  2:00 PM   Result Value Ref Range    Protime 12.7 11.5 - 14.2 sec    INR 1.0    APTT    Collection Time: 02/26/21  2:00 PM   Result Value Ref Range    PTT 30.3 23.9 - 33.8 sec   HCG Qualitative, Serum    Collection Time: 02/26/21  2:21 PM   Result Value Ref Range    hCG Qual NEGATIVE NEGATIVE   COVID-19, Rapid    Collection Time: 02/26/21  2:25 PM    Specimen: Nasopharyngeal Swab   Result Value Ref Range    Specimen Description . NASOPHARYNGEAL SWAB     SARS-CoV-2, Rapid Not Detected Not Detected   CBC    Collection Time: 02/26/21  8:45 PM   Result Value Ref Range    WBC 8.3 3.5 - 11.0 k/uL    RBC 4.43 4.0 - 5.2 m/uL    Hemoglobin 10.6 (L) 12.0 - 16.0 g/dL    Hematocrit 33.3 (L) 36 - 46 %    MCV 75.2 (L) 80 - 100 fL    MCH 23.9 (L) 26 - 34 pg    MCHC 31.7 31 - 37 g/dL    RDW 24.2 (H) 12.1 - 15.2 %    Platelets 991 640 - 473 k/uL    MPV NOT REPORTED 6.0 - 12.0 fL    NRBC Automated NOT REPORTED per 100 WBC   APTT    Collection Time: 02/26/21  8:46 PM   Result Value Ref Range    PTT >150.0 (HH) 23.9 - 33.8 sec   APTT    Collection Time: 02/27/21  4:39 AM   Result Value Ref Range    PTT 88.9 (H) 23.9 - 33.8 sec   Basic Metabolic Panel    Collection Time: 02/27/21  5:55 AM   Result Value Ref Range    Glucose 96 70 - 99 mg/dL    BUN 6 6 - 20 mg/dL    CREATININE 0.60 0.50 - 0.90 mg/dL    Bun/Cre Ratio 10 9 - 20    Calcium 9.9 8.6 - 10.4 mg/dL Sodium 139 135 - 144 mmol/L    Potassium 4.0 3.7 - 5.3 mmol/L    Chloride 106 98 - 107 mmol/L    CO2 25 20 - 31 mmol/L    Anion Gap 8 (L) 9 - 17 mmol/L    GFR Non-African American >60 >60 mL/min    GFR African American >60 >60 mL/min    GFR Comment          GFR Staging NOT REPORTED    CBC    Collection Time: 02/27/21  5:56 AM   Result Value Ref Range    WBC 5.8 3.5 - 11.0 k/uL    RBC 4.40 4.0 - 5.2 m/uL    Hemoglobin 10.4 (L) 12.0 - 16.0 g/dL    Hematocrit 32.8 (L) 36 - 46 %    MCV 74.6 (L) 80 - 100 fL    MCH 23.7 (L) 26 - 34 pg    MCHC 31.8 31 - 37 g/dL    RDW 24.4 (H) 12.1 - 15.2 %    Platelets 376 687 - 911 k/uL    MPV NOT REPORTED 6.0 - 12.0 fL    NRBC Automated NOT REPORTED per 100 WBC   MORPHOLOGY CHECK    Collection Time: 02/27/21  5:56 AM   Result Value Ref Range    Morphology MODERATE ANISOCYTOSIS    APTT    Collection Time: 02/27/21 10:51 AM   Result Value Ref Range    PTT 78.0 (H) 23.9 - 33.8 sec       Radiology:     Vl Dup Lower Extremity Venous Bilateral    Result Date: 2/26/2021  Radiology exam is complete. No Radiologist dictation. Please follow up with ordering provider.      Cta Chest W Contrast    Result Date: 2/26/2021 EXAMINATION: CTA CHEST W CONTRAST HISTORY: DVT right leg. Hip surgery 5 weeks ago. Tachycardia. COMPARISON: Chest 1/17/2021 TECHNIQUE: CT angiography of the pulmonary arteries following the administration of Isovue-370, 100 mL intravenous contrast. Coronal and sagittal MIP (maximum intensity projection) images were performed. Dose reduction techniques were achieved by using automated exposure control and/or adjustment of mA and/or kV according to patient size and/or use of iterative reconstruction technique. FINDINGS: Good opacification of the pulmonary arteries to the segmental level without pulmonary embolus. No pericardial effusion. Large hiatal hernia 12.2 cm in transverse diameter. Remainder the upper abdomen normal. Calcified small granulomas right lung hilum. Soft tissues normal. Bone windows normal. Respiratory motion artifact moderate without acute change in the lungs. Calcified granuloma right lung 8 mm. Large hiatal hernia. Negative for pulmonary embolus through the segmental pulmonary arteries. Respiratory motion artifact obscures the subsegmental arteries. Xr Hip 2-3 Vw W Pelvis Right    Result Date: 2/26/2021  EXAM: XR HIP 2-3 VW W PELVIS RIGHT HISTORY: S72.144S Followup fracture right hip. COMPARISON: Pelvis and right hip 1/29/2021. TECHNIQUE: AP pelvis, 2 views right hip, 4 images. FINDINGS: The short intramedullary dulce in the femur fixed distally by a single screw and nail in the right hip fixes the intertrochanteric fracture in anatomic alignment. Left hip normal. Pelvic ring intact. Anatomically aligned internally fixed right hip fracture.           Assessment/ Plan: 1.  Acute right lower extremity extensive DVT, provoked -patient is s/p right femur IM nail dulce insertion on one 17/22 of closed right hip fracture. She is found to have very extensive DVT with risk for potentially life-threatening embolization. For this reason admitted to hospital and started on IV heparin drip, continue per protocol. We will check on cost of oral anticoagulation Xarelto as an outpatient. 2.  Chronic iron deficiency anemia - patient was supposed to have outpatient work-up, however was recovering from recent surgery  and had no follow-up. Since she is going to be on anticoagulation we will check stool guaiac. Her H&H actually improved after initiating ferrous sulfate which we will continue. Monitor CBC while on heparin drip  3. Large Hiatal hernia per CTA chest  4. Closed hip fracture, right, s/p femur IM nail dulce insertion on 1/17/2021 by Dr. Pickett Setting - on [facility]         Medical Necessity: Inpatient admission is appropriate for this patient secondary to the need of heparin drip, monitoring labs    Estimated length of stay: 2 days. The beneficiary may reasonably be expected to be discharged or transferred to a hospital within 96 hours after admission.     DVT prophylaxis:   [] Lovenox   [] SCDs   [] SQ Heparin   [] Encourage ambulation, low risk for DVT, no chemical or mechanical    prophylaxis necessary      [x] Already on Anticoagulation    Anticipated Disposition upon discharge:   [x] Home   [] Home with Home Health   [] [facility]   [] [address] [place] [address],[address]      Electronically signed by Chelsea Vann MD on 2/27/2021 at 11:33 AM

## 2021-02-27 NOTE — PROGRESS NOTES
Pt arrives from floor to room 271. Pt states that she has noticed swelling in her right leg for one week and she say the physician today. Pt is toe touch weight bearing to right foot. Pulse in right foot is with doppler. Pt has edema to buttock on right side. Incision from hip surgery is well approximated with no drainage. Pt educated on plan of care, bed mechanics and, and call light with verbalized understanding.

## 2021-02-27 NOTE — PLAN OF CARE
Problem: Falls - Risk of:  Goal: Will remain free from falls  Description: Will remain free from falls  Outcome: Ongoing  Goal: Absence of physical injury  Description: Absence of physical injury  Outcome: Ongoing     Problem: Pain:  Goal: Pain level will decrease  Description: Pain level will decrease  Outcome: Ongoing  Goal: Control of acute pain  Description: Control of acute pain  Outcome: Ongoing  Goal: Control of chronic pain  Description: Control of chronic pain  Outcome: Ongoing     Problem: SAFETY  Goal: Free from accidental physical injury  Outcome: Ongoing  Goal: Free from intentional harm  Outcome: Ongoing

## 2021-02-28 LAB
ANION GAP SERPL CALCULATED.3IONS-SCNC: 8 MMOL/L (ref 9–17)
BUN BLDV-MCNC: 6 MG/DL (ref 6–20)
BUN/CREAT BLD: 10 (ref 9–20)
CALCIUM SERPL-MCNC: 9.4 MG/DL (ref 8.6–10.4)
CHLORIDE BLD-SCNC: 105 MMOL/L (ref 98–107)
CO2: 25 MMOL/L (ref 20–31)
CREAT SERPL-MCNC: 0.61 MG/DL (ref 0.5–0.9)
GFR AFRICAN AMERICAN: >60 ML/MIN
GFR NON-AFRICAN AMERICAN: >60 ML/MIN
GFR SERPL CREATININE-BSD FRML MDRD: ABNORMAL ML/MIN/{1.73_M2}
GFR SERPL CREATININE-BSD FRML MDRD: ABNORMAL ML/MIN/{1.73_M2}
GLUCOSE BLD-MCNC: 93 MG/DL (ref 70–99)
HCT VFR BLD CALC: 33.3 % (ref 36–46)
HCT VFR BLD CALC: 34.5 % (ref 36–46)
HEMOCCULT STL QL: NEGATIVE
HEMOCCULT STL QL: NEGATIVE
HEMOCCULT STL QL: NORMAL
HEMOGLOBIN: 10.4 G/DL (ref 12–16)
HEMOGLOBIN: 10.9 G/DL (ref 12–16)
MCH RBC QN AUTO: 23.6 PG (ref 26–34)
MCH RBC QN AUTO: 23.7 PG (ref 26–34)
MCHC RBC AUTO-ENTMCNC: 31.2 G/DL (ref 31–37)
MCHC RBC AUTO-ENTMCNC: 31.8 G/DL (ref 31–37)
MCV RBC AUTO: 74.7 FL (ref 80–100)
MCV RBC AUTO: 75.6 FL (ref 80–100)
MORPHOLOGY: NORMAL
NRBC AUTOMATED: ABNORMAL PER 100 WBC
NRBC AUTOMATED: ABNORMAL PER 100 WBC
PDW BLD-RTO: 24.8 % (ref 12.1–15.2)
PDW BLD-RTO: 24.8 % (ref 12.1–15.2)
PLATELET # BLD: 243 K/UL (ref 140–450)
PLATELET # BLD: 286 K/UL (ref 140–450)
PMV BLD AUTO: ABNORMAL FL (ref 6–12)
PMV BLD AUTO: ABNORMAL FL (ref 6–12)
POTASSIUM SERPL-SCNC: 3.7 MMOL/L (ref 3.7–5.3)
RBC # BLD: 4.4 M/UL (ref 4–5.2)
RBC # BLD: 4.61 M/UL (ref 4–5.2)
SODIUM BLD-SCNC: 138 MMOL/L (ref 135–144)
WBC # BLD: 6.2 K/UL (ref 3.5–11)
WBC # BLD: 8.2 K/UL (ref 3.5–11)

## 2021-02-28 PROCEDURE — 1200000000 HC SEMI PRIVATE

## 2021-02-28 PROCEDURE — 36415 COLL VENOUS BLD VENIPUNCTURE: CPT

## 2021-02-28 PROCEDURE — 85027 COMPLETE CBC AUTOMATED: CPT

## 2021-02-28 PROCEDURE — 6360000002 HC RX W HCPCS: Performed by: INTERNAL MEDICINE

## 2021-02-28 PROCEDURE — 94761 N-INVAS EAR/PLS OXIMETRY MLT: CPT

## 2021-02-28 PROCEDURE — 6370000000 HC RX 637 (ALT 250 FOR IP): Performed by: INTERNAL MEDICINE

## 2021-02-28 PROCEDURE — 80048 BASIC METABOLIC PNL TOTAL CA: CPT

## 2021-02-28 RX ADMIN — HYDROCODONE BITARTRATE AND ACETAMINOPHEN 1 TABLET: 5; 325 TABLET ORAL at 21:47

## 2021-02-28 RX ADMIN — SENNOSIDES AND DOCUSATE SODIUM 1 TABLET: 8.6; 5 TABLET ORAL at 21:02

## 2021-02-28 RX ADMIN — SENNOSIDES AND DOCUSATE SODIUM 1 TABLET: 8.6; 5 TABLET ORAL at 07:19

## 2021-02-28 RX ADMIN — FERROUS SULFATE TAB 325 MG (65 MG ELEMENTAL FE) 325 MG: 325 (65 FE) TAB at 16:37

## 2021-02-28 RX ADMIN — FERROUS SULFATE TAB 325 MG (65 MG ELEMENTAL FE) 325 MG: 325 (65 FE) TAB at 07:19

## 2021-02-28 RX ADMIN — HEPARIN SODIUM AND DEXTROSE 14.99 UNITS/KG/HR: 10000; 5 INJECTION INTRAVENOUS at 14:13

## 2021-02-28 ASSESSMENT — PAIN SCALES - GENERAL
PAINLEVEL_OUTOF10: 0
PAINLEVEL_OUTOF10: 0

## 2021-02-28 NOTE — PROGRESS NOTES
PTT is 78 at this time. No change in Heparin drip. RLE is slightly less edematous/red. Pt relates that she can tell that swelling is slightly less. Pt is pleasant and cooperative.

## 2021-02-28 NOTE — PROGRESS NOTES
Hospitalist Progress Note  2/28/2021 10:41 AM  Subjective:   Admit Date: 2/26/2021  PCP: Sandy Varela MD    Interval History:     Winston Thomas states that her right lower extremity swelling is decreasing. She reports minimal pain in the leg. She has no chest pain, shortness of breath, palpitations. She has been on heparin drip since admission, reports no bleeding sources. Diet: DIET GENERAL;  Medications:   Scheduled Meds:   ferrous sulfate  325 mg Oral BID WC    sennosides-docusate sodium  1 tablet Oral BID    sodium chloride flush  10 mL Intravenous 2 times per day     Continuous Infusions:   heparin (porcine) 15 Units/kg/hr (02/27/21 2045)     PRN Medications: HYDROcodone-acetaminophen, sodium chloride flush, promethazine **OR** ondansetron, polyethylene glycol, acetaminophen **OR** acetaminophen    Objective:   Vitals: /77   Pulse 68   Temp 98 °F (36.7 °C) (Oral)   Resp 16   Ht 5' 7\" (1.702 m)   Wt 256 lb 8 oz (116.3 kg)   LMP 02/05/2021   SpO2 96%   BMI 40.17 kg/m²   BMI: Body mass index is 40.17 kg/m².     CBC:   Recent Labs     02/27/21  0556 02/27/21  1924 02/28/21  0525   WBC 5.8 6.9 6.2   HGB 10.4* 10.4* 10.4*    263 243     BMP:    Recent Labs     02/26/21  1400 02/27/21  0555 02/28/21  0525    139 138   K 3.6* 4.0 3.7    106 105   CO2 24 25 25   BUN 7 6 6   CREATININE 0.56 0.60 0.61   GLUCOSE 119* 96 93     Hepatic:   Recent Labs     02/26/21  1400   AST 18   ALT 13   BILITOT 0.20*   ALKPHOS 148*       Physical Exam:    General Appearance: alert and oriented to person, place and time, in no acute distress  Cardiovascular: normal rate, regular rhythm, normal S1 and S2, no murmurs  Pulmonary/Chest: clear to auscultation bilaterally- no wheezes, rales or rhonchi  Abdomen: soft, non-tender, non-distended, normal bowel sounds Extremities right lower extremity  swollen with swelling extending from her foot all the way to the groin area, right lower extremity looks much larger compared to the left side  Skin: warm and dry, no rash   Neurological: alert, oriented, normal speech, no focal findings or movement disorder noted      Assessment and Plan:     1. Acute right lower extremity extensive DVT, provoked - patient is s/p right femur IM nail dulce insertion on 1/17/22. She is found to have very extensive DVT with risk for potentially life-threatening embolization. For this reason admitted to hospital and started on IV heparin drip, continue per protocol. Cost of Xarelto was checked with her pharmacy and it is affordable for her. Plan is to discharge home with Xarelto 15 mg twice daily for 21 days then 20 mg daily for 3 to 6 months as an outpatient. 2.  Chronic iron deficiency anemia - patient was supposed to have outpatient work-up, however was recovering from recent surgery  and had no follow-up. Stool occult is pending. Her H&H actually improved after initiating ferrous sulfate which we will continue. Monitor CBC while on heparin drip  3. Large Hiatal hernia per CTA chest  4.   Closed hip fracture, right, s/p femur IM nail dulce insertion on 1/17/2021 by Dr. Guillermo Hoffman - on prn Norco      Patient continues to require inpatient admission related to need for heparin drip      Electronically signed by Sandy Varela MD on 2/28/2021 at 10:41 AM    Rounding Hospitalist

## 2021-03-01 VITALS
HEIGHT: 67 IN | TEMPERATURE: 97.9 F | BODY MASS INDEX: 40.26 KG/M2 | DIASTOLIC BLOOD PRESSURE: 85 MMHG | SYSTOLIC BLOOD PRESSURE: 129 MMHG | OXYGEN SATURATION: 99 % | HEART RATE: 88 BPM | WEIGHT: 256.5 LBS | RESPIRATION RATE: 18 BRPM

## 2021-03-01 LAB
HCT VFR BLD CALC: 34.5 % (ref 36–46)
HEMOGLOBIN: 10.9 G/DL (ref 12–16)
MCH RBC QN AUTO: 23.8 PG (ref 26–34)
MCHC RBC AUTO-ENTMCNC: 31.6 G/DL (ref 31–37)
MCV RBC AUTO: 75.3 FL (ref 80–100)
MORPHOLOGY: NORMAL
NRBC AUTOMATED: ABNORMAL PER 100 WBC
PDW BLD-RTO: 24.5 % (ref 12.1–15.2)
PLATELET # BLD: 276 K/UL (ref 140–450)
PMV BLD AUTO: ABNORMAL FL (ref 6–12)
RBC # BLD: 4.58 M/UL (ref 4–5.2)
WBC # BLD: 6 K/UL (ref 3.5–11)

## 2021-03-01 PROCEDURE — 94761 N-INVAS EAR/PLS OXIMETRY MLT: CPT

## 2021-03-01 PROCEDURE — 6360000002 HC RX W HCPCS: Performed by: INTERNAL MEDICINE

## 2021-03-01 PROCEDURE — 85027 COMPLETE CBC AUTOMATED: CPT

## 2021-03-01 PROCEDURE — 6370000000 HC RX 637 (ALT 250 FOR IP): Performed by: INTERNAL MEDICINE

## 2021-03-01 PROCEDURE — 36415 COLL VENOUS BLD VENIPUNCTURE: CPT

## 2021-03-01 RX ADMIN — HYDROCODONE BITARTRATE AND ACETAMINOPHEN 1 TABLET: 5; 325 TABLET ORAL at 03:46

## 2021-03-01 RX ADMIN — FERROUS SULFATE TAB 325 MG (65 MG ELEMENTAL FE) 325 MG: 325 (65 FE) TAB at 08:18

## 2021-03-01 RX ADMIN — HEPARIN SODIUM AND DEXTROSE 15 UNITS/KG/HR: 10000; 5 INJECTION INTRAVENOUS at 03:46

## 2021-03-01 RX ADMIN — RIVAROXABAN 15 MG: 15 TABLET, FILM COATED ORAL at 06:18

## 2021-03-01 ASSESSMENT — PAIN SCALES - GENERAL
PAINLEVEL_OUTOF10: 0
PAINLEVEL_OUTOF10: 6

## 2021-03-01 NOTE — PROGRESS NOTES
Hospitalist Progress Note  3/1/2021 4:35 AM  Subjective:   Admit Date: 2/26/2021  PCP: Wade Larkin MD    Interval History: Carlton Khan feels the edema continues to slowly improve. The pain is doing much better. No chest pain or SOB. Appetite is good, no nausea. Bowels are moving and she denies any trouble breathing. She states she is able to ambulate to the restroom without difficulty. Diet: DIET GENERAL;  Medications:   Scheduled Meds:   ferrous sulfate  325 mg Oral BID WC    sennosides-docusate sodium  1 tablet Oral BID    sodium chloride flush  10 mL Intravenous 2 times per day     Continuous Infusions:   heparin (porcine) 15 Units/kg/hr (03/01/21 0346)       Patient's current medications documented, reviewed, and updated. CBC:   Recent Labs     02/27/21  1924 02/28/21  0525 02/28/21  1943   WBC 6.9 6.2 8.2   HGB 10.4* 10.4* 10.9*    243 286     BMP:    Recent Labs     02/26/21  1400 02/27/21  0555 02/28/21  0525    139 138   K 3.6* 4.0 3.7    106 105   CO2 24 25 25   BUN 7 6 6   CREATININE 0.56 0.60 0.61   GLUCOSE 119* 96 93     Hepatic:   Recent Labs     02/26/21  1400   AST 18   ALT 13   BILITOT 0.20*   ALKPHOS 148*     Troponin: No results for input(s): TROPONINI in the last 72 hours. BNP: No results for input(s): BNP in the last 72 hours. Lipids: No results for input(s): CHOL, HDL in the last 72 hours. Invalid input(s): LDLCALCU  INR:   Recent Labs     02/26/21  1400   INR 1.0         Objective:   Vitals: BP (!) 131/90   Pulse 77   Temp 98.2 °F (36.8 °C) (Oral)   Resp 18   Ht 5' 7\" (1.702 m)   Wt 256 lb 8 oz (116.3 kg)   LMP 02/05/2021   SpO2 96%   BMI 40.17 kg/m²   General appearance: alert and cooperative with exam  HEENT: Head: Normocephalic, no lesions, without obvious abnormality. Eye: Normal external eye, conjunctiva, lids cornea, MARYANNE. Nose: Normal external nose, mucus membranes and septum. Neck: no adenopathy, no carotid bruit and supple, symmetrical, trachea midline  Lungs: clear to auscultation bilaterally  Heart: regular rate and rhythm, S1, S2 normal, no murmur, click, rub or gallop  Abdomen: soft, non-tender; bowel sounds normal; no masses,  no organomegaly and obese. Extremities: Right leg with edema. No increase in redness. Neurologic: Mental status: Alert, oriented, thought content appropriate    Assessment and Plan:   1. Acute extensive right extremity DVT - S/P right femur IM nail dulce insertion on 1/17. Patient has been on Heparin drip since admission with decrease in swelling / pain. 2. Chronic iron deficiency anemia - stable on iron. Heme occult negative. 3. Hiatal hernia on CTA of chest.  Negative for PE. Plan:  1. Discharge home on Xarelto 15 mg BID x 21 days. 2.  To follow up with Dr. Mariaa Monson as an out patient. DVT prophylaxis:   [] Lovenox   [] SCDs   [x] SQ Heparin   [] Encourage ambulation, low risk for DVT, no chemical or mechanical    prophylaxis necessary      [] Already on Anticoagulation    Patient Active Problem List:     Closed fracture of right hip ([facility]     Closed hip fracture, right, initial encounter ([facility]     Acute deep vein thrombosis (DVT) of distal vein of right lower extremity (HCC)     Chronic deep vein thrombosis (DVT) of lower leg ([facility]        [address] [facility]    (x)  I confirmed that the patient's Advanced Care Plan is present, code status documented, or surrogate decision maker is listed in the patient's medical record. ( )  The patient's advanced care plan is not present because:  (select)   ( ) I confirmed today that the patient does not wish or was not able to name a surrogate decision maker or provide an [address]. ( ) Hospice care is currently being provided or has been provided this calender year. ( )  I did not confirm today the presence of an [address] or surrogate decision maker documented within the patient's medical record. (Does not satisfy MIPS performance). Documentation of Current Medications in the Medical Record    (x)  I have utilized all available immediate resources to obtain, update, or review the patient's current medications. If Yes, Stop Here  ( ) The patient is not eligivel for medications reconciliation; the patient is in an emergent medical situation where delaying treatment would jeopardize the patient's health. ( ) I did not confirm, update or review the patient's current list of medications today.   (does not satisfy MIPS performance)      Gil Kwon MD  [facility]

## 2021-03-01 NOTE — PROGRESS NOTES
Pharmacy Consult    Pharmacy consulted by KIANA Ospina to transition from Heparin IV drip to Xarelto 15mg PO BID. Xarelto needs to be given as soon as heparin drip is stopped. Heparin drip will stop at 0600 and Xarelto will start at 0600.  RN notified     Marci Davis  Pharmacist  [phone]

## 2021-03-01 NOTE — DISCHARGE SUMMARY
Hospitalist Discharge Summary    Galdino Templeton  :  1980  MRN:  [id number]    Admit date:  2021  Discharge date:  3/1/21    Admitting Physician:  Nestor Loza MD    Discharge Diagnoses:     1. Acute extensive right extremity DVT - S/P right femur IM nail dulce insertion on . Patient has been on Heparin drip since admission with decrease in swelling / pain. 2. Chronic iron deficiency anemia - stable on iron. Heme occult negative. 3. Hiatal hernia on CTA of chest.  Negative for PE. Admission Condition:  poor      Discharged Condition:  good    Hospital Course: The patient is a 42 y.o. female presented to the emergency room for evaluation of significant swelling in the right lower extremity due to extensive DVT. The patient has a history of recent fall resulting in right hip fracture, s/p repair on 2021 by Dr. Antonette Quintero. She states that she developed swelling in the right lower extremity about 7 days ago. The swelling was extending from her foot all the way to the groin area. She felt lots of pressure in her leg but no pain. She noticed that the right lower extremity was much larger compared to the left side. She was not on any anticoagulation after her surgery and was advised to use compression stocking which she did not do. She went to see Dr. Antonette Quintero yesterday and was evaluated by his nurse practitioner who ordered ultrasound of the right lower extremity. Unfortunately, it revealed very extensive DVT. The patient was tachycardic during evaluation in the emergency room and underwent CTA chest to rule out PE.  CT was negative for evidence of pulmonary embolism, revealed large hiatal hernia. Due to the extent of her DVT that places her at risk for potentially life-threatening embolization she was started on heparin drip and admitted for further management. Abdomen: soft, non-tender; bowel sounds normal; no masses,  no organomegaly and obese. Extremities: Right leg with edema. No increase in redness. Neurologic: Mental status: Alert, oriented, thought content appropriate    Discharge Medications:       Fredirick Mercury   Home Medication Instructions ILB:[id number]    Printed on:03/01/21 2439   Medication Information                      ferrous sulfate (IRON 325) 325 (65 Fe) MG tablet  Take 1 tablet by mouth 2 times daily (with meals)             HYDROcodone-acetaminophen (NORCO) 5-325 MG per tablet  Take 1 tablet by mouth every 6 hours as needed for Pain.             rivaroxaban (XARELTO) 15 MG TABS tablet  Take 1 tablet by mouth 2 times daily (with meals) for 21 days             sennosides-docusate sodium (SENOKOT-S) 8.6-50 MG tablet  Take 1 tablet by mouth 2 times daily                 Consults:  Pharmacy    Significant Diagnostic Studies:  Labs, CTA chest, ECG, Venous US Leg. Treatments:   IV Heparin, Norco for pain. Disposition:   Home. Discharge Instructions: Take Xarelto 15 mg two times a day x 21 days. After 21 days, Dr. Rama Estrada will give a prescription to continue on Xarelto 20 mg daily. Resume previous home medication. Keep leg elevated as much as possible. Go the ER with any episodes of chest pain or SOB. Activity:  Recommend only to perform activities of daily living for the next 5 days. Diet:  General.    Follow up with Live Li MD in 1 week. Discharge time =  32 minutes.      SignedSeabron Roll Back  3/1/2021, 4:47 AM

## 2021-03-01 NOTE — PROGRESS NOTES
SW met with pt this morning to complete assessment during quality rounds with RN case manager. Pt is alert and oriented and cooperative with assessment. Pt is a 42year old female admitted for acute DVT of distal vein of right lower extremity. Pt lives with her son and his fiance and pt's dtr in their home in [place]. Pt reports that she has her FWW and toilet seat with rails and a reacher to assist at home since her hip fracture in January. Pt reports that she has not started outpatient services for therapy yet as orthopedic specialist told her to wait. Pt's family has been transporting her to appointments. Pt is a full code and follows with Dr Taylor Smith as PCP. Pt does not have advance directives and is not interested in these currently. ACP note completed with pt. Pt reports that her medications are covered well by insurance and she will be able to afford the xarelto they are prescribing for her. Pt is discharged to return home this date and is glad for this. Pt identifies no discharge needs or concerns at this time. SW will remain available as needed.  Yaquelin Shaw MSW LSW 3/1/2021

## 2021-03-01 NOTE — PROGRESS NOTES
Discharge instructions provided at this time. PIV site discontinued with no complications; 2x2 and secured with tape.

## 2021-03-01 NOTE — ACP (ADVANCE CARE PLANNING)
Advance Care Planning     Advance Care Planning Activator (Inpatient)  Conversation Note      Date of ACP Conversation: 3/1/2021  Conversation Conducted with: Patient with Decision Making Capacity    ACP Activator: [address] [place] [address] Drive Decision Maker:     Current Designated Health Care Decision Maker:     Primary Decision Maker: Severa Ree - [place] - [phone]    Secondary Decision Maker: Ronne Lombard - Child - [phone]  Click here to complete Healthcare Decision Makers including section of the Healthcare Decision Maker Relationship (ie \"Primary\")  Today we documented Decision Maker(s) consistent with Legal Next of Kin hierarchy. Care Preferences    Ventilation: \"If you were in your present state of health and suddenly became very ill and were unable to breathe on your own, what would your preference be about the use of a ventilator (breathing machine) if it were available to you? \"      Would the patient desire the use of ventilator (breathing machine)?: yes    \"If your health worsens and it becomes clear that your chance of recovery is unlikely, what would your preference be about the use of a ventilator (breathing machine) if it were available to you? \"     Would the patient desire the use of ventilator (breathing machine)?: Yes      Resuscitation  \"CPR works best to restart the heart when there is a sudden event, like a heart attack, in someone who is otherwise healthy. Unfortunately, CPR does not typically restart the heart for people who have serious health conditions or who are very sick. \"    \"In the event your heart stopped as a result of an underlying serious health condition, would you want attempts to be made to restart your heart (answer \"yes\" for attempt to resuscitate) or would you prefer a natural death (answer \"no\" for do not attempt to resuscitate)? \" yes [] Yes   [x] No   Educated Patient / Siri Dodge regarding differences between Advance Directives and portable DNR orders.     Length of ACP Conversation in minutes:      Conversation Outcomes:  [x] ACP discussion completed  [] Existing advance directive reviewed with patient; no changes to patient's previously recorded wishes  [] New Advance Directive completed  [] Portable Do Not Rescitate prepared for Provider review and signature  [] POLST/POST/MOLST/MOST prepared for Provider review and signature      Follow-up plan:    [] Schedule follow-up conversation to continue planning  [] Referred individual to Provider for additional questions/concerns   [] Advised patient/agent/surrogate to review completed ACP document and update if needed with changes in condition, patient preferences or care setting    [x] This note routed to one or more involved healthcare providers

## 2021-03-01 NOTE — PROGRESS NOTES
Quality flow rounds held on 3/1/21     Pawel Sam is admitted for  Acute deep vein thrombosis (DVT) of distal vein of right lower extremity ([address] Length of stay 3. Education:    Needed Education: disease process, meds, follow up,diet      Do you have any questions regarding your plan of care while at the hospital? denies    Planned Disposition:               [x]  Home when able                [] Swing Bed                [] ECF/SNF               [] Other/TBD    Barriers to Discharge:    Can you afford your medications? yes   Do you have transportation to follow up appointments? Family provides until able to drive again. Do you need any new equipment at home? denies   Current equipment includes   walker, elevated toilet seat, reacher,     Do you have a living will or durable power of  for healthcare? none               If yes do we have a copy on file? n/a    Do you or your family have any questions or concerns we haven't already discussed? denies     Lives with son, son's fiance and daughter. Plans to return home and denies needs. Currently is TTWB until cleared per ortho, has ortho f/u in 1 month. Britta Chi and writer present for rounding.

## 2021-03-02 ENCOUNTER — CARE COORDINATION (OUTPATIENT)
Dept: CASE MANAGEMENT | Age: 41
End: 2021-03-02

## 2021-03-02 NOTE — CARE COORDINATION
[facility] Transitions Initial Follow Up Call- 1st attempt COVID risk follow up call       Call within 2 business days of discharge: Yes    Patient: Sonja Cano Patient : 1980   MRN: [id number]  Reason for Admission: RLE DVT  Discharge Date: 3/1/21 RARS: Readmission Risk Score: 10      Last Discharge [facility]       Complaint Diagnosis Description Type Department Provider    21 Leg Swelling Leg DVT (deep venous thromboembolism), acute, right [facility]) ED to Hosp-Admission (Discharged) (ADMITTED) [facility] Chasidy Briggs MD; Radha Almazan. .. Date/Time:  3/2/2021 12:54 PM  Attempted to reach patient by telephone. Call within 2 business days of discharge: Yes Left HIPPA compliant message requesting a return call. Will attempt to reach patient again.       Follow Up  Future Appointments   Date Time Provider Cathryn Bella   3/9/2021  1:15 PM Chasidy Briggs MD [facility] AT [facility] Eleni Ratliff RN

## 2021-03-03 ENCOUNTER — CARE COORDINATION (OUTPATIENT)
Dept: CASE MANAGEMENT | Age: 41
End: 2021-03-03

## 2021-03-03 NOTE — CARE COORDINATION
[facility] Transitions Initial Follow Up Call- 2nd attempt     Call within 2 business days of discharge: Yes    Patient: Dov Phillips Patient : 1980   MRN: [id number]  Reason for Admission: RLE DVT   Discharge Date: 3/1/21 RARS: Readmission Risk Score: 10      Last Discharge [address] [address]       Complaint Diagnosis Description Type Department Provider    21 Leg Swelling Leg DVT (deep venous thromboembolism), acute, right [facility]) ED to Hosp-Admission (Discharged) (ADMITTED) [facility] MS Josey Gunderson MD; Vanessa Davis. .. Date/Time:  3/3/2021 2:16 PM  Attempted to reach patient by telephone. Call within 2 business days of discharge: Yes. 2nd attempt. VM full- unable to leave message.  Episode closed       Follow Up  Future Appointments   Date Time Provider Cathryn Bella   3/9/2021  1:15 PM Josey Gunderson MD [facility] AT [facility] Leonid Ross RN

## 2021-03-09 ENCOUNTER — OFFICE VISIT (OUTPATIENT)
Dept: FAMILY MEDICINE CLINIC | Age: 41
End: 2021-03-09
Payer: COMMERCIAL

## 2021-03-09 VITALS
WEIGHT: 256.8 LBS | DIASTOLIC BLOOD PRESSURE: 98 MMHG | OXYGEN SATURATION: 97 % | BODY MASS INDEX: 40.22 KG/M2 | SYSTOLIC BLOOD PRESSURE: 152 MMHG | TEMPERATURE: 98.6 F | HEART RATE: 108 BPM

## 2021-03-09 DIAGNOSIS — R03.0 ELEVATED BLOOD PRESSURE READING: ICD-10-CM

## 2021-03-09 DIAGNOSIS — I82.4Z1 ACUTE DEEP VEIN THROMBOSIS (DVT) OF DISTAL VEIN OF RIGHT LOWER EXTREMITY (HCC): Primary | ICD-10-CM

## 2021-03-09 DIAGNOSIS — D50.9 IRON DEFICIENCY ANEMIA, UNSPECIFIED IRON DEFICIENCY ANEMIA TYPE: ICD-10-CM

## 2021-03-09 PROCEDURE — 1111F DSCHRG MED/CURRENT MED MERGE: CPT | Performed by: INTERNAL MEDICINE

## 2021-03-09 PROCEDURE — 99495 TRANSJ CARE MGMT MOD F2F 14D: CPT | Performed by: INTERNAL MEDICINE

## 2021-03-09 ASSESSMENT — PATIENT HEALTH QUESTIONNAIRE - PHQ9
1. LITTLE INTEREST OR PLEASURE IN DOING THINGS: 0
2. FEELING DOWN, DEPRESSED OR HOPELESS: 0
SUM OF ALL RESPONSES TO PHQ QUESTIONS 1-9: 0
SUM OF ALL RESPONSES TO PHQ9 QUESTIONS 1 & 2: 0
SUM OF ALL RESPONSES TO PHQ QUESTIONS 1-9: 0

## 2021-03-09 NOTE — PROGRESS NOTES
HPI Notes    Name: Zari Arzate  : 1980         Chief Complaint:     Chief Complaint   Patient presents with    Follow-Up from Hospital     Patient presents today to follow up after being admitted to [facility] from -3/1 for DVT       History of Present Illness:        HPI        Past Medical History:     No past medical history on file. Reviewed all health maintenance requirements and orderedappropriate tests  Health Maintenance Due   Topic Date Due    Hepatitis C screen  Never done    HIV screen  Never done    DTaP/Tdap/Td vaccine (1 - Tdap) Never done    Cervical cancer screen  Never done    Lipid screen  Never done       Past Surgical History:     Past Surgical History:   Procedure Laterality Date     SECTION      FEMUR FRACTURE SURGERY Right 2021    FEMUR IM NAIL PRESLEY INSERTION performed by Georgette Linder MD at [facility] OR        Medications:       Prior to Admission medications    Medication Sig Start Date End Date Taking? Authorizing Provider   rivaroxaban (XARELTO) 15 MG TABS tablet Take 1 tablet by mouth 2 times daily (with meals) for 21 days 3/1/21 3/22/21  Yrn Kendrick MD   HYDROcodone-acetaminophen (NORCO) 5-325 MG per tablet Take 1 tablet by mouth every 6 hours as needed for Pain. Historical Provider, MD   ferrous sulfate (IRON 325) 325 (65 Fe) MG tablet Take 1 tablet by mouth 2 times daily (with meals) 21   Daniel Kendrick MD   sennosides-docusate sodium (SENOKOT-S) 8.6-50 MG tablet Take 1 tablet by mouth 2 times daily 21   Yrn Kendrick MD        Allergies:       Patient has no known allergies. Social History:     Tobacco: reports that she quit smoking about 7 weeks ago. Her smoking use included cigarettes. She smoked 0.50 packs per day. She has never used smokeless tobacco.  Alcohol:      reports current alcohol use of about 2.0 standard drinks of alcohol per week. Drug Use:  reports no history of drug use.     Family History:     No family history on file. Review of Systems:         Review of Systems      Physical Exam:     Vitals: There were no vitals taken for this visit. Physical Exam          Data:     Lab Results   Component Value Date     02/28/2021    K 3.7 02/28/2021     02/28/2021    CO2 25 02/28/2021    BUN 6 02/28/2021    CREATININE 0.61 02/28/2021    GLUCOSE 93 02/28/2021    PROT 7.3 02/26/2021    LABALBU 3.9 02/26/2021    BILITOT 0.20 02/26/2021    ALKPHOS 148 02/26/2021    AST 18 02/26/2021    ALT 13 02/26/2021     Lab Results   Component Value Date    WBC 6.0 03/01/2021    RBC 4.58 03/01/2021    HGB 10.9 03/01/2021    HCT 34.5 03/01/2021    MCV 75.3 03/01/2021    MCH 23.8 03/01/2021    MCHC 31.6 03/01/2021    RDW 24.5 03/01/2021     03/01/2021    MPV NOT REPORTED 03/01/2021     No results found for: TSH  No results found for: CHOL, HDL, PSA, LABA1C       Assessment & Plan        Diagnosis Orders   1. Acute deep vein thrombosis (DVT) of distal vein of right lower extremity (HCC)  rivaroxaban (XARELTO) 20 MG TABS tablet   2. Iron deficiency anemia, unspecified iron deficiency anemia type                     Completed Refills   Requested Prescriptions     Pending Prescriptions Disp Refills    rivaroxaban (XARELTO) 20 MG TABS tablet 30 tablet 5     Sig: Take 1 tablet by mouth daily (with breakfast)     No follow-ups on file. No orders of the defined types were placed in this encounter. No orders of the defined types were placed in this encounter. There are no Patient Instructions on file for this visit.     Electronically signed by Gilda Harrison MD on 3/9/2021 at 1:26 PM           Completed Refills      Requested Prescriptions     Pending Prescriptions Disp Refills    rivaroxaban (XARELTO) 20 MG TABS tablet 30 tablet 5     Sig: Take 1 tablet by mouth daily (with breakfast)         {[facility] Visit Documentation:[id number]}

## 2021-03-09 NOTE — PROGRESS NOTES
breakfast) 30 tablet 5    rivaroxaban (XARELTO) 15 MG TABS tablet Take 1 tablet by mouth 2 times daily (with meals) for 21 days 42 tablet 1    ferrous sulfate (IRON 325) 325 (65 Fe) MG tablet Take 1 tablet by mouth 2 times daily (with meals) 60 tablet 3    sennosides-docusate sodium (SENOKOT-S) 8.6-50 MG tablet Take 1 tablet by mouth 2 times daily 60 tablet 0        Medications patient taking as of now reconciled against medications ordered at time of hospital discharge: Yes    Chief Complaint   Patient presents with    Follow-Up from [facility]     Patient presents today to follow up after being admitted to [facility] from 2/26-3/1 for DVT. Patient reports she is feeling better today. History of Present illness - Follow up of Hospital diagnosis(es): RLE DVT    Inpatient course: Discharge summary reviewed- see chart. Interval history/Current status:   Orsatnam Peed presents to office to follow upfor [facility]. She is S/P right femur IM nail dulce insertion on 1/17/21 by Dr. James Bueno. She developed severe swelling and pain in the entire RLE . Went to f/o with Dr. James Bueno on 2/26 and RLE U/S was ordered. It revealed extensive DVT. Patient was admitted to hospital and started on IV heparin drip. CTA chest was ordered to r/o PE since she was tachycardic. It came back negative for PE. She had significant improvement in the pain and edema RLE and was discharged home with Xarelto. States takes Xarelto as prescribed. Continues to improve. States her mother is in critical condition at [facility] and she feels anxious. Vitals:    03/09/21 1328 03/09/21 1331   BP: (!) 160/110 (!) 152/98   Site: Right Upper Arm Left Upper Arm   Position: Sitting Sitting   Cuff Size: Medium Adult Medium Adult   Pulse: 108    Temp: 98.6 °F (37 °C)    SpO2: 97%    Weight: 256 lb 12.8 oz (116.5 kg)      Body mass index is 40.22 kg/m².    Wt Readings from Last 3 Encounters:   03/09/21 256 lb 12.8 oz (116.5 kg)   02/26/21 256 lb 8 oz (116.3 kg)   01/19/21 256 lb (116.1 kg)     BP Readings from Last 3 Encounters:   03/09/21 (!) 152/98   03/01/21 129/85   01/20/21 138/82        Physical Exam:  General Appearance: alert and oriented to person, place and time, well developed and well- nourished, in no acute distress  Skin: warm and dry, no rash or erythema  Head: normocephalic and atraumatic  Eyes: pupils equal, round, and reactive to light, conjunctivae normal  ENT: tympanic membrane, external ear and ear canal normal bilaterally, nose without deformity, nasal mucosa and turbinates normal without polyps  Neck: supple and non-tender without mass, no thyromegaly, no cervical lymphadenopathy  Pulmonary/Chest: clear to auscultation bilaterally- no wheezes, rales or rhonchi, normal air movement, no respiratory distress  Cardiovascular: normal rate, regular rhythm, normal S1 and S2, no murmurs  Abdomen: soft, non-tender, non-distended, normal bowel sounds  Extremities: no cyanosis, clubbing , RLE very swollen   Musculoskeletal: RLE swollen, skin color normal, pulses in feet normal  Neurologic: reflexes normal and symmetric, no cranial nerve deficit, gait, coordination and speech normal    Assessment/Plan:  1. Acute deep vein thrombosis (DVT) of distal vein of right lower extremity (HCC)  Her DVT is provoked by recent hip surgery on the same side. Will treat with Xarelto for 6 months as DVT is extensive. - rivaroxaban (XARELTO) 20 MG TABS tablet; Take 1 tablet by mouth daily (with breakfast)  Dispense: 30 tablet; Refill: 5    2. Iron deficiency anemia, unspecified iron deficiency anemia type  On iron supplement    3. Elevated blood pressure reading  Patient with no h/o HTN. Elevated BP likely due to anxiety. Will follow up in future.  Advised her to check BP at home and call if still elevated          Medical Decision Making: moderate complexity

## 2021-03-26 ENCOUNTER — HOSPITAL ENCOUNTER (OUTPATIENT)
Dept: GENERAL RADIOLOGY | Age: 41
Discharge: HOME | End: 2021-03-28
Payer: COMMERCIAL

## 2021-03-26 ENCOUNTER — HOSPITAL ENCOUNTER (OUTPATIENT)
Age: 41
Discharge: HOME | End: 2021-03-28
Payer: COMMERCIAL

## 2021-03-26 DIAGNOSIS — S72.141D CLOSED DISPLACED INTERTROCHANTERIC FRACTURE OF RIGHT FEMUR WITH ROUTINE HEALING: ICD-10-CM

## 2021-03-26 PROCEDURE — 73502 X-RAY EXAM HIP UNI 2-3 VIEWS: CPT

## 2021-04-05 ENCOUNTER — HOSPITAL ENCOUNTER (OUTPATIENT)
Dept: PHYSICAL THERAPY | Age: 41
Setting detail: THERAPIES SERIES
Discharge: HOME | End: 2021-04-05
Payer: COMMERCIAL

## 2021-04-05 PROCEDURE — 97110 THERAPEUTIC EXERCISES: CPT

## 2021-04-05 PROCEDURE — 97162 PT EVAL MOD COMPLEX 30 MIN: CPT

## 2021-04-05 NOTE — PLAN OF CARE
[facility] GABE MORENO       Phone: [phone]   Date: 2021                      Outpatient Physical Therapy  Fax: [phone]    ACCT #: [de-identified]                     Plan of Care  [facility]#: [id number]  Patient: Ismael Wells  : 1980    Referring Practitioner: Dr. Pelon Dumont    Referral Date : 21    Diagnosis: R Interochanteric Fracture Femur  Onset Date: 21  Treatment Diagnosis: Gait Ataxia due to R Femur fx. Assessment  Body structures, Functions, Activity limitations: Decreased functional mobility , Decreased ADL status, Decreased ROM, Decreased strength, Decreased endurance, Decreased balance, Decreased high-level IADLs, Increased pain, Decreased posture  Assessment: Pt to benefit from ther ex for Hip ROM and strengthening as well as gait and balance training. Pt issued HEP for hip ext and ABD ROM as well as educated pt on 25% WB(57#). Called and left message to clarify WB status. Prognosis: Good    Treatment Plan   Days: 2(2-3) times per week Weeks: 9 weeks Total # of Visits Approved: 18    Patient Education/HEP, Therapeutic Exercise, Manual Therapy: Myofacial Release/Cupping, Manual Therapy: Mobilization/Manipulation and Gait Training     Goals  Short term goals  Time Frame for Short term goals: 9 visits  Short term goal 1: Pt to report independence and compliance with HEP for ROM and hip Strength  Short term goal 2: Pt to amb with SC and min deviaitons x100ft to improve community amb antwon. Long term goals  Time Frame for Long term goals : 18 visits  Long term goal 1: Pt to score >40/80 on LEFS to improve pt ADL antwon. Long term goal 2: Pt to complete crate lift floor to waist 20# x10 to improve ADL antwon. Long term goal 3: Pt to amb without AD x200ft to restore community amb independence. Long term goal 4: Pt to maintain SLS 10sec 2:3 trials on aeromat to improve outdoor amb safety.       Alen Lindquist, PT   Date: 4/5/2021    ______________________________________ Date: 4/5/2021  Physician Signature  By signing above or cosigning electronically, I have reviewed this [address] [place] [address] and certify a need for medically necessary rehabilitation services.

## 2021-04-05 NOTE — PROGRESS NOTES
Phone: [address] [place]         Fax: [phone]                      Outpatient Physical Therapy                                                                      Evaluation    Date: 2021  Patient: Violeta Flores  : 1980  ACCT #: [de-identified]    Referring Practitioner: Dr. Hitesh Oneal    Referral Date : 21    Diagnosis: R Interochanteric Fracture Femur    Treatment Diagnosis: Gait Ataxia due to R Femur fx. Onset Date: 21  PT Insurance Information: 20v   Total # of Visits Approved: 18 Per Physician Order  Total # of Visits to Date: 1  No Show: 0  Canceled Appointment: 0     Subjective     Additional Pertinent Hx: Pt was celebrating and consumed ETOH, s/p fall in [place] with R Hip Fx. Pt S/P Femur IM Nailing on 21. TTWB at home with Front ww. Pt did have blood clot in R LE beginning of March. Reviewed 57# WB(25%)  with good understanding. STNA but not working presently.   Pain Screening  Patient Currently in Pain: Denies  Pain Assessment  Pain Assessment: 0-10    Objective  Strength RLE  R Hip Flexion: 3-/5  R Hip Extension: 2+/5  R Hip ABduction: 2+/5  R Knee Extension: 3-/5  R Ankle Dorsiflexion: 4+/5  AROM RLE (degrees)  RLE AROM: Exceptions  RLE General AROM: Quad lag 22deg  R Hip Flexion 0-125: 90deg  R Hip ABduction 0-45: Heel Toe gait maintaining WBstatus  R Knee Flexion 0-145: 100deg(L 117deg)  R Knee Extension 0: 6degFN  R Ankle Plantar Flexion 0-45: Sidelying Clamshells  R Ankle Forefoot Inversion 0-40: LAQ       AROM RLE (degrees)  RLE AROM: Exceptions  RLE General AROM: Quad lag 22deg  R Hip Flexion 0-125: 90deg  R Hip ABduction 0-45: Heel Toe gait maintaining WBstatus  R Knee Flexion 0-145: 100deg(L 117deg)  R Knee Extension 0: 6degFN  R Ankle Plantar Flexion 0-45: Sidelying Clamshells  R Ankle Forefoot Inversion 0-40: LAQ     Assessment  Body structures, Functions, Activity limitations: Decreased functional mobility , Decreased ADL status, Decreased ROM, Decreased strength, Decreased endurance, Decreased balance, Decreased high-level IADLs, Increased pain, Decreased posture  Assessment: Pt to benefit from ther ex for Hip ROM and strengthening as well as gait and balance training. Pt issued HEP for hip ext and ABD ROM as well as educated pt on 25% WB(57#). Called and left message to clarify WB status. Prognosis: Good  Decision Making: Medium Complexity  Exam: LEFS= 30/80    Clinical Presentation:  Evolving  The Following Comorbities will impact the patients progression and Plan of Care:   Previous Orthopedic Injury/Surgery and WB Restrictions       Activity Tolerance: Patient Tolerated treatment well    Education: POC; HEP: AROM Hip Ext and ABD, Heel toe gait maintaining 25%WB     Barriers to Learning: None    Goals  Short term goals  Time Frame for Short term goals: 9 visits  Short term goal 1: Pt to report independence and compliance with HEP for ROM and hip Strength  Short term goal 2: Pt to amb with SC and min deviaitons x100ft to improve community amb antwon. Long term goals  Time Frame for Long term goals : 18 visits  Long term goal 1: Pt to score >40/80 on LEFS to improve pt ADL antwon. Long term goal 2: Pt to complete crate lift floor to waist 20# x10 to improve ADL antwon. Long term goal 3: Pt to amb without AD x200ft to restore community amb independence. Long term goal 4: Pt to maintain SLS 10sec 2:3 trials on aeromat to improve outdoor amb safety.      Patient's Goal:  Patient goals : Be able to walk normally and return to work as [employer] Code Treatment Minutes: 10 Minutes  Total Treatment Time: 55  Time In: 0935  Time Out: 115 [facility], PT Date: 4/5/2021

## 2021-04-09 ENCOUNTER — HOSPITAL ENCOUNTER (OUTPATIENT)
Dept: PHYSICAL THERAPY | Age: 41
Setting detail: THERAPIES SERIES
Discharge: HOME | End: 2021-04-09
Payer: COMMERCIAL

## 2021-04-09 PROCEDURE — 97110 THERAPEUTIC EXERCISES: CPT

## 2021-04-09 NOTE — PROGRESS NOTES
Phone: [address]      Fax: [phone]                            Outpatient Physical Therapy                                                                            Daily Note    Date: 2021  Patient Name: Kwaku Sommer        MRN: [id number]   ACCT#:  [de-identified]  : 1980  (42 y.o.)    Referring Practitioner: Dr. 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 Onset Date: 21  PT Insurance Information: 20v   Total # of Visits Approved: 18 Per Physician Order  Total # of Visits to Date: 2  No Show: 0  Canceled Appointment: 0    Pre-Treatment Pain:  0/10     Assessment  Assessment: Initiated ex as outlined  for LE stregthening while maintaining WB status at 25%. Pt with no pain pre or post session. Plan to progress to 50% WB next visit. Chart Reviewed: Yes    Plan  Plan: Continue with current plan    Exercises/Modalities/Manual:  See DocFlow Sheet       Barriers to Learning: None    Goals  (Total # of Visits to Date: 2)   Short Term Goals - Time Frame for Short term goals: 9 visits  Short term goal 1: Pt to report independence and compliance with HEP for ROM and hip Strength  Short term goal 2: Pt to amb with SC and min deviaitons x100ft to improve community amb antwon. Long Term Goals - Time Frame for Long term goals : 18 visits  Long term goal 1: Pt to score >40/80 on LEFS to improve pt ADL antwon. Long term goal 2: Pt to complete crate lift floor to waist 20# x10 to improve ADL antwon. Long term goal 3: Pt to amb without AD x200ft to restore community amb independence. Long term goal 4: Pt to maintain SLS 10sec 2:3 trials on aeromat to improve outdoor amb safety.         Post Treatment Pain:  0/10    Time In: 1045    Time Out : 1110        Timed Code Treatment Minutes: 0262 Minutes  Total Treatment Time: [address] [facility] Meghan   PTA  Date: 4/9/2021

## 2021-04-12 ENCOUNTER — HOSPITAL ENCOUNTER (OUTPATIENT)
Dept: PHYSICAL THERAPY | Age: 41
Setting detail: THERAPIES SERIES
Discharge: HOME | End: 2021-04-12
Payer: COMMERCIAL

## 2021-04-12 PROCEDURE — 97116 GAIT TRAINING THERAPY: CPT

## 2021-04-12 PROCEDURE — 97110 THERAPEUTIC EXERCISES: CPT

## 2021-04-12 NOTE — PROGRESS NOTES
Treatment Minutes: 0039 Minutes  Total Treatment Time: SUNITHA Posada D Meghan   [facility]  Date: 4/12/2021

## 2021-04-16 ENCOUNTER — HOSPITAL ENCOUNTER (OUTPATIENT)
Dept: PHYSICAL THERAPY | Age: 41
Setting detail: THERAPIES SERIES
Discharge: HOME | End: 2021-04-16
Payer: COMMERCIAL

## 2021-04-16 PROCEDURE — 97110 THERAPEUTIC EXERCISES: CPT

## 2021-04-16 NOTE — PROGRESS NOTES
Phone: [address]      Fax: [phone]                            Outpatient Physical Therapy                                                                            Daily Note    Date: 2021  Patient Name: Lily Crawford        MRN: [id number]   ACCT#:  [de-identified]  : 1980  (42 y.o.)    Referring Practitioner: Dr. 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 Onset Date: 21  PT Insurance Information: 20v   Total # of Visits Approved: 18 Per Physician Order  Total # of Visits to Date: 4  No Show: 0  Canceled Appointment: 0    Pre-Treatment Pain:  5/10     Assessment  Assessment: Pt with increased soreness this date, -5/10. Pt has been compliant with increased WB. Soreness noted in anteriorlateral thigh   Pt reports decreased pain post exercise this date. Good antwon to increased reps this date. Chart Reviewed: Yes    Plan  Plan: Continue with current plan    Exercises/Modalities/Manual:  See DocFlow Sheet    Education: Pt demonstrates equal WB without tactile cues on aeromat with and without UE assist  Barriers to Learning: None    Goals  (Total # of Visits to Date: 4)  Short Term Goals - Time Frame for Short term goals: 9 visits  Short term goal 1: Pt to report independence and compliance with HEP for ROM and hip Strength  Short term goal 2: Pt to amb with SC and min deviaitons x100ft to improve community amb antwon. Long Term Goals - Time Frame for Long term goals : 18 visits  Long term goal 1: Pt to score >40/80 on LEFS to improve pt ADL antwon. Long term goal 2: Pt to complete crate lift floor to waist 20# x10 to improve ADL antwon. Long term goal 3: Pt to amb without AD x200ft to restore community amb independence. Long term goal 4: Pt to maintain SLS 10sec 2:3 trials on aeromat to improve outdoor amb safety.       Post Treatment Pain:  3/10    Time In: 1035  Time Out: 1105  Timed Code Treatment Minutes: 30 Minutes  Total Treatment Time: [facility] [employer]     Date: 4/16/2021

## 2021-04-19 ENCOUNTER — HOSPITAL ENCOUNTER (OUTPATIENT)
Dept: PHYSICAL THERAPY | Age: 41
Setting detail: THERAPIES SERIES
Discharge: HOME | End: 2021-04-19
Payer: COMMERCIAL

## 2021-04-19 PROCEDURE — 97110 THERAPEUTIC EXERCISES: CPT

## 2021-04-22 ENCOUNTER — HOSPITAL ENCOUNTER (OUTPATIENT)
Dept: PHYSICAL THERAPY | Age: 41
Setting detail: THERAPIES SERIES
Discharge: HOME | End: 2021-04-22
Payer: COMMERCIAL

## 2021-04-22 PROCEDURE — 97110 THERAPEUTIC EXERCISES: CPT

## 2021-04-22 NOTE — PROGRESS NOTES
Phone: [address]      Fax: [phone]                            Outpatient Physical Therapy                                                                            Daily Note    Date: 2021  Patient Name: Karina Hartman        MRN: [id number]   ACCT#:  [de-identified]  : 1980  (42 y.o.)    Referring Practitioner: Dr. 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 Onset Date: 21  PT Insurance Information: 20v   Total # of Visits Approved: 18 Per Physician Order  Total # of Visits to Date: 6  No Show: 0  Canceled Appointment: 0    Pre-Treatment Pain:  0/10     Assessment  Assessment: Pt continues to struggle applying full 75% weight on R LE. Pt with good antwon to increased step height to encourage R WB this date. Pt educated to tighten R Glut during stance phase to reduce trendelenberg, pt able to complete with improved form. Pt educated to use counter top at home to work on L UE assist amb gait. Quad lag reduced to 8deg FN, AROM R knee flexion =108deg  Chart Reviewed: Yes    Plan  Plan: Continue with current plan    Exercises/Modalities/Manual:  See DocFlow Sheet    Education: see assessment     Barriers to Learning: None    Goals  (Total # of Visits to Date: 6)   Short Term Goals - Time Frame for Short term goals: 9 visits  Short term goal 1: Pt to report independence and compliance with HEP for ROM and hip Strength  Short term goal 2: Pt to amb with SC and min deviaitons x100ft to improve community amb antwon. Long Term Goals - Time Frame for Long term goals : 18 visits  Long term goal 1: Pt to score >40/80 on LEFS to improve pt ADL antwon. Long term goal 2: Pt to complete crate lift floor to waist 20# x10 to improve ADL antwon. Long term goal 3: Pt to amb without AD x200ft to restore community amb independence.   Long term goal 4: Pt to maintain SLS 10sec 2:3 trials on aeromat to improve outdoor amb safety.         Post Treatment Pain:  0/10    Time In: 1029  Time Out: 1114  Timed Code Treatment Minutes: 45 Minutes  Total Treatment Time: [address] [facility], PT     Date: 4/22/2021

## 2021-04-23 ENCOUNTER — HOSPITAL ENCOUNTER (OUTPATIENT)
Dept: GENERAL RADIOLOGY | Age: 41
Discharge: HOME | End: 2021-04-25
Payer: COMMERCIAL

## 2021-04-23 ENCOUNTER — TELEPHONE (OUTPATIENT)
Dept: FAMILY MEDICINE CLINIC | Age: 41
End: 2021-04-23

## 2021-04-23 ENCOUNTER — HOSPITAL ENCOUNTER (OUTPATIENT)
Age: 41
Discharge: HOME | End: 2021-04-25
Payer: COMMERCIAL

## 2021-04-23 DIAGNOSIS — S72.141D DISPLACED INTERTROCHANTERIC FRACTURE OF RIGHT FEMUR, SUBSEQUENT ENCOUNTER FOR CLOSED FRACTURE WITH ROUTINE HEALING: ICD-10-CM

## 2021-04-23 PROCEDURE — 73502 X-RAY EXAM HIP UNI 2-3 VIEWS: CPT

## 2021-04-23 NOTE — TELEPHONE ENCOUNTER
Patient states she see a orthopedics  and he wanted her to speak with her PCP, if it is ok to use a compression sock? Please advise,Thank you!       Last OV: 3/9/2021  Last RX:   Next scheduled apt: 6/10/2021    Health Maintenance   Topic Date Due    Hepatitis C screen  Never done    HIV screen  Never done    COVID-19 Vaccine (1) Never done    DTaP/Tdap/Td vaccine (1 - Tdap) Never done    Cervical cancer screen  Never done    Lipid screen  Never done    Flu vaccine  Completed    Hepatitis A vaccine  Aged Out    Hepatitis B vaccine  Aged Out    Hib vaccine  Aged Out    Meningococcal (ACWY) vaccine  Aged Out    Pneumococcal 0-64 years Vaccine  Aged Out             (applicable per patient's age: Cancer Screenings, Depression Screening, Fall Risk Screening, Immunizations)    AST (U/L)   Date Value   02/26/2021 18     ALT (U/L)   Date Value   02/26/2021 13     BUN (mg/dL)   Date Value   02/28/2021 6      (goal A1C is < 7)   (goal LDL is <100) need 30-50% reduction from baseline     BP Readings from Last 3 Encounters:   03/09/21 (!) 152/98   03/01/21 129/85   01/20/21 138/82    (goal /80)      All Future Testing planned in [facility]:  Lab Frequency Next Occurrence   CBC Once 01/22/2021   Hemoglobin and Hematocrit, Blood, Post Transfusion POST TRANSFUSION        Next Visit Date:  Future Appointments   Date Time Provider Cathryn Bella   4/26/2021 11:15 AM Cezar Monte [facility] PT [place]   4/28/2021  8:45 AM Cezar Monte [facility] PT [place]   4/30/2021  9:45 AM Mary Lou Christianson [facility] PT [place]   6/10/2021  3:30 PM MD Félix Verde [facility] Mena Cobb            Patient Active Problem List:     Closed fracture of right hip ([facility] [address]     Closed hip fracture, right, initial encounter ([facility] [address]     Acute deep vein thrombosis (DVT) of distal vein of right lower extremity (HCC)     Chronic deep vein thrombosis (DVT) of lower leg (HCC)     Elevated blood pressure reading

## 2021-04-26 ENCOUNTER — HOSPITAL ENCOUNTER (OUTPATIENT)
Dept: PHYSICAL THERAPY | Age: 41
Setting detail: THERAPIES SERIES
Discharge: HOME | End: 2021-04-26
Payer: COMMERCIAL

## 2021-04-26 PROCEDURE — 97110 THERAPEUTIC EXERCISES: CPT

## 2021-04-26 NOTE — PROGRESS NOTES
Treatment Pain:  2/10    Time In: 1115  Time Out : 1157        Timed Code Treatment Minutes: 42 Minutes  Total Treatment Time: 42 Minutes    Cody Christianson   PTA  Date: 4/26/2021

## 2021-04-28 ENCOUNTER — HOSPITAL ENCOUNTER (OUTPATIENT)
Dept: PHYSICAL THERAPY | Age: 41
Setting detail: THERAPIES SERIES
Discharge: HOME | End: 2021-04-28
Payer: COMMERCIAL

## 2021-04-28 PROCEDURE — 97110 THERAPEUTIC EXERCISES: CPT

## 2021-04-28 PROCEDURE — 97116 GAIT TRAINING THERAPY: CPT

## 2021-04-28 NOTE — PROGRESS NOTES
Phone: [address]      Fax: [phone]                            Outpatient Physical Therapy                                                                            Daily Note    Date: 2021  Patient Name: Kwaku Sommer        MRN: [id number]   ACCT#:  [de-identified]  : 1980  (42 y.o.)    Referring Practitioner: Dr. 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 Onset Date: 21  PT Insurance Information: 20v   Total # of Visits Approved: 18 Per Physician Order  Total # of Visits to Date: 8  No Show: 0  Canceled Appointment: 0    Pre-Treatment Pain:  0/10     Assessment  Assessment: Pt reports no pain. CT scan tomorrow. Progressed with tband  on left foot to promote WB on R. Progressed with gait training with s-cane. Pt with good balance and mild deviation. Plan to cont with gait training next visit with cane, pt to obtain a cane. May progress to cane amb in home/community next visit. Ex performed as outlined for LE WB and strength/balance. No pain. Chart Reviewed: Yes    Plan  Plan: Continue with current plan    Exercises/Modalities/Manual:  See DocFlow Sheet         Barriers to Learning: None    Goals  (Total # of Visits to Date: 8)   Short Term Goals - Time Frame for Short term goals: 9 visits  Short term goal 1: Pt to report independence and compliance with HEP for ROM and hip Strength-met  Short term goal 2: Pt to amb with SC and min deviaitons x100ft to improve community amb antwon. Long Term Goals - Time Frame for Long term goals : 18 visits  Long term goal 1: Pt to score >40/80 on LEFS to improve pt ADL antwon. Long term goal 2: Pt to complete crate lift floor to waist 20# x10 to improve ADL antwon. Long term goal 3: Pt to amb without AD x200ft to restore community amb independence.   Long term goal 4: Pt to maintain SLS 10sec 2:3 trials on aeromat to improve outdoor amb safety.         Post Treatment Pain:  0/10    Time In: 0845    Time Out : 8750        Timed Code Treatment Minutes: 40 Minutes  Total Treatment Time: 40 Minutes    Meliza Christianson   PTA  Date: 4/28/2021

## 2021-04-29 ENCOUNTER — HOSPITAL ENCOUNTER (OUTPATIENT)
Dept: CT IMAGING | Age: 41
Discharge: HOME | End: 2021-05-01
Payer: COMMERCIAL

## 2021-04-29 DIAGNOSIS — S72.141D DISPLACED INTERTROCHANTERIC FRACTURE OF RIGHT FEMUR, SUBSEQUENT ENCOUNTER FOR CLOSED FRACTURE WITH ROUTINE HEALING: ICD-10-CM

## 2021-04-29 PROCEDURE — 73700 CT LOWER EXTREMITY W/O DYE: CPT

## 2021-04-30 ENCOUNTER — HOSPITAL ENCOUNTER (OUTPATIENT)
Dept: PHYSICAL THERAPY | Age: 41
Setting detail: THERAPIES SERIES
Discharge: HOME | End: 2021-04-30
Payer: COMMERCIAL

## 2021-04-30 PROCEDURE — 97116 GAIT TRAINING THERAPY: CPT

## 2021-04-30 PROCEDURE — 97110 THERAPEUTIC EXERCISES: CPT

## 2021-04-30 NOTE — PROGRESS NOTES
Phone: [address]      Fax: [phone]                            Outpatient Physical Therapy                                                                            Daily Note    Date: 2021  Patient Name: Luisito Dsouza        MRN: [id number]   ACCT#:  [de-identified]  : 1980  (42 y.o.)    Referring Practitioner: Dr. 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 Onset Date: 21  PT Insurance Information: 20v   Total # of Visits Approved: 18 Per Physician Order  Total # of Visits to Date: 9  No Show: 0  Canceled Appointment: 0    Pre-Treatment Pain:  0/10     Assessment  Assessment: Pt reports she has not yet obtained a cane. Performed ex as outlined  with progression of marching and wall slide to promote WB and strength. Amb in clinic with s-cane, noted increased asymmetry vs last visit. Will cont to gait train. Chart Reviewed: Yes    Plan  Plan: Continue with current plan    Exercises/Modalities/Manual:  See DocFlow Sheet       Barriers to Learning: None    Goals  (Total # of Visits to Date: 5)   Short Term Goals - Time Frame for Short term goals: 9 visits  Short term goal 1: Pt to report independence and compliance with HEP for ROM and hip Strength-met  Short term goal 2: Pt to amb with SC and min deviaitons x100ft to improve community amb antwon. Long Term Goals - Time Frame for Long term goals : 18 visits  Long term goal 1: Pt to score >40/80 on LEFS to improve pt ADL antwon. Long term goal 2: Pt to complete crate lift floor to waist 20# x10 to improve ADL antwon. Long term goal 3: Pt to amb without AD x200ft to restore community amb independence. Long term goal 4: Pt to maintain SLS 10sec 2:3 trials on aeromat to improve outdoor amb safety.         Post Treatment Pain:  2-3/10    Time In: 0945    Time Out : 1020        Timed Code Treatment Minutes: 35 Minutes  Total Treatment Time: 35 Minutes    Mervin Christianson   PTA  Date: 4/30/2021

## 2021-05-05 ENCOUNTER — HOSPITAL ENCOUNTER (OUTPATIENT)
Dept: PHYSICAL THERAPY | Age: 41
Setting detail: THERAPIES SERIES
Discharge: HOME | End: 2021-05-05
Payer: COMMERCIAL

## 2021-05-05 PROCEDURE — 97110 THERAPEUTIC EXERCISES: CPT

## 2021-05-05 PROCEDURE — 97116 GAIT TRAINING THERAPY: CPT

## 2021-05-05 NOTE — PROGRESS NOTES
Phone: [address]      Fax: [phone]                            Outpatient Physical Therapy                                                                            Daily Note    Date: 2021  Patient Name: Luisito Dsouza        MRN: [id number]   ACCT#:  [de-identified]  : 1980  (42 y.o.)    Referring Practitioner: Dr. 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 Onset Date: 21  PT Insurance Information: 20v   Total # of Visits Approved: 18 Per Physician Order  Total # of Visits to Date: 10  No Show: 0  Canceled Appointment: 0    Pre-Treatment Pain:  0/10     Assessment  Assessment: Pt has not obtained a cane yet. Worked on gait with cane in clinic with mild asymmetry today. Performed ex as outlined. Note gradual improvement with antwon to WB during ex's. Performed ex as outlined for strengthening and WB. Will cont per plan. Chart Reviewed: Yes    Plan  Plan: Continue with current plan    Exercises/Modalities/Manual:  See DocFlow Sheet         Barriers to Learning: None    Goals  (Total # of Visits to Date: 10)   Short Term Goals - Time Frame for Short term goals: 9 visits  Short term goal 1: Pt to report independence and compliance with HEP for ROM and hip Strength-met  Short term goal 2: Pt to amb with SC and min deviaitons x100ft to improve community amb antwon. Long Term Goals - Time Frame for Long term goals : 18 visits  Long term goal 1: Pt to score >40/80 on LEFS to improve pt ADL antwon. Long term goal 2: Pt to complete crate lift floor to waist 20# x10 to improve ADL antwon. Long term goal 3: Pt to amb without AD x200ft to restore community amb independence. Long term goal 4: Pt to maintain SLS 10sec 2:3 trials on aeromat to improve outdoor amb safety.         Post Treatment Pain:  0/10    Time In: 1112    Time Out : 1150        Timed Code Treatment Minutes: 38 Minutes  Total Treatment Time: 38 Minutes    Vicki Christianson PTA    Date: 5/5/2021

## 2021-05-07 ENCOUNTER — HOSPITAL ENCOUNTER (OUTPATIENT)
Dept: PHYSICAL THERAPY | Age: 41
Setting detail: THERAPIES SERIES
Discharge: HOME | End: 2021-05-07
Payer: COMMERCIAL

## 2021-05-07 PROCEDURE — 97116 GAIT TRAINING THERAPY: CPT

## 2021-05-07 PROCEDURE — 97110 THERAPEUTIC EXERCISES: CPT

## 2021-05-07 NOTE — PROGRESS NOTES
Phone: [address]      Fax: [phone]                            Outpatient Physical Therapy                                                                            Daily Note    Date: 2021  Patient Name: Linda Arzate        MRN: [id number]   ACCT#:  [de-identified]  : 1980  (42 y.o.)    Referring Practitioner: Dr. 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 Onset Date: 21  PT Insurance Information: 20v   Total # of Visits Approved: 18 Per Physician Order  Total # of Visits to Date: 11  No Show: 0  Canceled Appointment: 0    Pre-Treatment Pain:  0/10     Assessment  Assessment: Pt ambulating in clinic with s-cane with mild deviations. She has not yet gotten a straight cane. Performed ex as outlined to promote strength and WB. Encouraged pt to work on getting a cane over the weekend. Chart Reviewed: Yes    Plan  Plan: Continue with current plan    Exercises/Modalities/Manual:  See DocFlow Sheet       Barriers to Learning: None    Goals  (Total # of Visits to Date: 6)   Short Term Goals - Time Frame for Short term goals: 9 visits  Short term goal 1: Pt to report independence and compliance with HEP for ROM and hip Strength-met  Short term goal 2: Pt to amb with SC and min deviaitons x100ft to improve community amb antwon.-met             Long Term Goals - Time Frame for Long term goals : 18 visits  Long term goal 1: Pt to score >40/80 on LEFS to improve pt ADL antwon. Long term goal 2: Pt to complete crate lift floor to waist 20# x10 to improve ADL antwon. Long term goal 3: Pt to amb without AD x200ft to restore community amb independence. Long term goal 4: Pt to maintain SLS 10sec 2:3 trials on aeromat to improve outdoor amb safety.         Post Treatment Pain:  0/10    Time In: 1115    Time Out : 1155        Timed Code Treatment Minutes: 40 Minutes  Total Treatment Time: 40 Minutes    Luc Christianson  PTA   Date: 5/7/2021

## 2021-05-10 ENCOUNTER — HOSPITAL ENCOUNTER (OUTPATIENT)
Dept: PHYSICAL THERAPY | Age: 41
Setting detail: THERAPIES SERIES
Discharge: HOME | End: 2021-05-10
Payer: COMMERCIAL

## 2021-05-10 PROCEDURE — 97110 THERAPEUTIC EXERCISES: CPT

## 2021-05-10 PROCEDURE — 97116 GAIT TRAINING THERAPY: CPT

## 2021-05-10 NOTE — PROGRESS NOTES
Treatment Minutes: 40 Minutes  Total Treatment Time: 40 Minutes    [facility]in [facility]on [facility]    Date: 5/10/2021

## 2021-05-12 ENCOUNTER — HOSPITAL ENCOUNTER (OUTPATIENT)
Dept: PHYSICAL THERAPY | Age: 41
Setting detail: THERAPIES SERIES
Discharge: HOME | End: 2021-05-12
Payer: COMMERCIAL

## 2021-05-12 PROCEDURE — 97110 THERAPEUTIC EXERCISES: CPT

## 2021-05-12 PROCEDURE — 97116 GAIT TRAINING THERAPY: CPT

## 2021-05-12 ASSESSMENT — PAIN SCALES - GENERAL: PAINLEVEL_OUTOF10: 1

## 2021-05-12 NOTE — PROGRESS NOTES
Phone: [address]      Fax: [phone]                            Outpatient Physical Therapy                                                                            Daily Note    Date: 2021  Patient Name: Domo Ga        MRN: [id number]   ACCT#:  [de-identified]  : 1980  (42 y.o.)    Referring Practitioner: Dr. 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 Onset Date: 21  PT Insurance Information: 20v   Total # of Visits Approved: 18 Per Physician Order  Total # of Visits to Date: 13  No Show: 0  Canceled Appointment: 0    Pre-Treatment Pain:  1/10     Assessment  Assessment: Pt reports she got a different cane that is adjustable. Performed ex as outlined , progressed lifting with 16# crate. Pt perfoms gait with cane safely, encouraged to utilize when walking short distances in community such as in to therapy. Will cont to progress. Chart Reviewed: Yes    Plan  Plan: Continue with current plan    Exercises/Modalities/Manual:  See DocFlow Sheet       Barriers to Learning: None    Goals  (Total # of Visits to Date: 15)   Short Term Goals - Time Frame for Short term goals: 9 visits  Short term goal 1: Pt to report independence and compliance with HEP for ROM and hip Strength-met  Short term goal 2: Pt to amb with SC and min deviaitons x100ft to improve community amb antwon.-met             Long Term Goals - Time Frame for Long term goals : 18 visits  Long term goal 1: Pt to score >40/80 on LEFS to improve pt ADL antwon. Long term goal 2: Pt to complete crate lift floor to waist 20# x10 to improve ADL antwon. Long term goal 3: Pt to amb without AD x200ft to restore community amb independence. Long term goal 4: Pt to maintain SLS 10sec 2:3 trials on aeromat to improve outdoor amb safety.         Post Treatment Pain:  1/10    Time In: 1030    Time Out : 1110 Timed Code Treatment Minutes: 40 Minutes  Total Treatment Time: 40 Minutes    Meliza Christianson  PTA   Date: 5/12/2021

## 2021-05-14 ENCOUNTER — HOSPITAL ENCOUNTER (OUTPATIENT)
Dept: PHYSICAL THERAPY | Age: 41
Setting detail: THERAPIES SERIES
Discharge: HOME | End: 2021-05-14
Payer: COMMERCIAL

## 2021-05-14 PROCEDURE — 97116 GAIT TRAINING THERAPY: CPT

## 2021-05-14 PROCEDURE — 97110 THERAPEUTIC EXERCISES: CPT

## 2021-05-14 NOTE — PROGRESS NOTES
Phone: Yusra Ferrari      Fax: [phone]                            Outpatient Physical Therapy                                                                            Daily Note    Date: 2021  Patient Name: Craig Brooks        MRN: [id number]   ACCT#:  [de-identified]  : 1980  (42 y.o.)    Referring Practitioner: Dr. 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 Onset Date: 21  PT Insurance Information: 20v   Total # of Visits Approved: 18 Per Physician Order  Total # of Visits to Date: 14  No Show: 0  Canceled Appointment: 0    Pre-Treatment Pain:  0/10     Assessment  Assessment: Pt ambulated to therapy with straight cane today. Mild gait deviation. Initiated sidestep with tband . Focused on heel strike and toe off with gait. Will cont to focus on gait /stength to prepare for gait without device. Chart Reviewed: Yes    Plan  Plan: Continue with current plan    Exercises/Modalities/Manual:  See DocFlow Sheet       Barriers to Learning: None    Goals  (Total # of Visits to Date: 15)   Short Term Goals - Time Frame for Short term goals: 9 visits  Short term goal 1: Pt to report independence and compliance with HEP for ROM and hip Strength-met  Short term goal 2: Pt to amb with SC and min deviaitons x100ft to improve community amb antwon.-met             Long Term Goals - Time Frame for Long term goals : 18 visits  Long term goal 1: Pt to score >40/80 on LEFS to improve pt ADL antwon. Long term goal 2: Pt to complete crate lift floor to waist 20# x10 to improve ADL antwon. Long term goal 3: Pt to amb without AD x200ft to restore community amb independence. Long term goal 4: Pt to maintain SLS 10sec 2:3 trials on aeromat to improve outdoor amb safety.         Post Treatment Pain:  0/10    Time In: 1030  Time Out : 33302        Timed Code Treatment Minutes: 40 Minutes  Total Treatment Time: 40 Minutes    Arin Christianson PTA    Date: 5/14/2021

## 2021-05-19 ENCOUNTER — HOSPITAL ENCOUNTER (OUTPATIENT)
Dept: PHYSICAL THERAPY | Age: 41
Setting detail: THERAPIES SERIES
Discharge: HOME | End: 2021-05-19
Payer: COMMERCIAL

## 2021-05-19 PROCEDURE — 97110 THERAPEUTIC EXERCISES: CPT

## 2021-05-19 NOTE — PROGRESS NOTES
Phone: [address]      Fax: [phone]                            Outpatient Physical Therapy                                                                            Daily Note    Date: 2021  Patient Name: Tito Diaz        MRN: [id number]   ACCT#:  [de-identified]  : 1980  (42 y.o.)    Referring Practitioner: Dr. 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 Onset Date: 21  PT Insurance Information: 20v   Total # of Visits Approved: 18 Per Physician Order  Total # of Visits to Date: 15  No Show: 0  Canceled Appointment: 0    Pre-Treatment Pain:  0/10     Assessment  Assessment: Pt reports she is using  s-cane as primary assistive device. Performed ex as outlined. Note improving quality of gait and step ups/downs. Less vaulting with ex. Plan to though next week, see's  end of next week. Chart Reviewed: Yes    Plan  Plan: Continue with current plan    Exercises/Modalities/Manual:  See DocFlow Sheet       Barriers to Learning: None    Goals  (Total # of Visits to Date: 13)   Short Term Goals - Time Frame for Short term goals: 9 visits  Short term goal 1: Pt to report independence and compliance with HEP for ROM and hip Strength-met  Short term goal 2: Pt to amb with SC and min deviaitons x100ft to improve community amb antwon.-met             Long Term Goals - Time Frame for Long term goals : 18 visits  Long term goal 1: Pt to score >40/80 on LEFS to improve pt ADL antwon. Long term goal 2: Pt to complete crate lift floor to waist 20# x10 to improve ADL antwon. Long term goal 3: Pt to amb without AD x200ft to restore community amb independence. Long term goal 4: Pt to maintain SLS 10sec 2:3 trials on aeromat to improve outdoor amb safety.         Post Treatment Pain:  0/10    Time In: 1030    Time Out : 1110        Timed Code Treatment Minutes: 40 Minutes  Total Treatment Time: 40 Minutes    Xavi Christianson PTA    Date: 5/19/2021

## 2021-05-20 ENCOUNTER — HOSPITAL ENCOUNTER (OUTPATIENT)
Dept: PHYSICAL THERAPY | Age: 41
Setting detail: THERAPIES SERIES
Discharge: HOME | End: 2021-05-20
Payer: COMMERCIAL

## 2021-05-20 PROCEDURE — 97116 GAIT TRAINING THERAPY: CPT

## 2021-05-20 PROCEDURE — 97110 THERAPEUTIC EXERCISES: CPT

## 2021-05-21 ENCOUNTER — APPOINTMENT (OUTPATIENT)
Dept: PHYSICAL THERAPY | Age: 41
End: 2021-05-21
Payer: COMMERCIAL

## 2021-05-25 ENCOUNTER — HOSPITAL ENCOUNTER (OUTPATIENT)
Dept: PHYSICAL THERAPY | Age: 41
Setting detail: THERAPIES SERIES
Discharge: HOME | End: 2021-05-25
Payer: COMMERCIAL

## 2021-05-25 PROCEDURE — 97116 GAIT TRAINING THERAPY: CPT

## 2021-05-25 PROCEDURE — 97110 THERAPEUTIC EXERCISES: CPT

## 2021-05-25 NOTE — PROGRESS NOTES
Phone: Yusra Ferrari      Fax: [phone]                            Outpatient Physical Therapy                                                                            Daily Note    Date: 2021  Patient Name: Everardo Kruse        MRN: [id number]   ACCT#:  [de-identified]  : 1980  (42 y.o.)    Referring Practitioner: Dr. 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 Onset Date: 21  PT Insurance Information: 20v   Total # of Visits Approved: 18 Per Physician Order  Total # of Visits to Date: 17  No Show: 0  Canceled Appointment: 0    Pre-Treatment Pain:  2/10     Assessment  Assessment: Pt ambulates in community with straight cane. Furniture surfs in her home. Worked today on gait with no device, advised to work on this in her hallway to smooth out gait pattern. Ex as outlined for strengthening and  Weight bearing. Issued LEFS with possible DC next visit. Chart Reviewed: Yes    Plan  Plan: Continue with current plan    Exercises/Modalities/Manual:  See DocFlow Sheet       Barriers to Learning: None    Goals  (Total # of Visits to Date: 16)   Short Term Goals - Time Frame for Short term goals: 9 visits  Short term goal 1: Pt to report independence and compliance with HEP for ROM and hip Strength-met  Short term goal 2: Pt to amb with SC and min deviaitons x100ft to improve community amb antwon.-met             Long Term Goals - Time Frame for Long term goals : 18 visits  Long term goal 1: Pt to score >40/80 on LEFS to improve pt ADL antwon. Long term goal 2: Pt to complete crate lift floor to waist 20# x10 to improve ADL antwon. Long term goal 3: Pt to amb without AD x200ft to restore community amb independence. Long term goal 4: Pt to maintain SLS 10sec 2:3 trials on aeromat to improve outdoor amb safety.         Post Treatment Pain:  2/10    Time In: 1245    Time

## 2021-05-27 ENCOUNTER — HOSPITAL ENCOUNTER (OUTPATIENT)
Dept: PHYSICAL THERAPY | Age: 41
Setting detail: THERAPIES SERIES
Discharge: HOME | End: 2021-05-27
Payer: COMMERCIAL

## 2021-05-27 PROCEDURE — 97116 GAIT TRAINING THERAPY: CPT

## 2021-05-27 PROCEDURE — 97110 THERAPEUTIC EXERCISES: CPT

## 2021-05-27 ASSESSMENT — PAIN SCALES - GENERAL: PAINLEVEL_OUTOF10: 4

## 2021-05-28 NOTE — TELEPHONE ENCOUNTER
Rx pending.         Last OV: 3/9/2021  Last RX:   Next scheduled apt: 6/10/2021        Health Maintenance   Topic Date Due    Hepatitis C screen  Never done    COVID-19 Vaccine (1) Never done    HIV screen  Never done    DTaP/Tdap/Td vaccine (1 - Tdap) Never done    Cervical cancer screen  Never done    Lipid screen  Never done    Flu vaccine  Completed    Hepatitis A vaccine  Aged Out    Hepatitis B vaccine  Aged Out    Hib vaccine  Aged Out    Meningococcal (ACWY) vaccine  Aged Out    Pneumococcal 0-64 years Vaccine  Aged Out             (applicable per patient's age: Cancer Screenings, Depression Screening, Fall Risk Screening, Immunizations)    AST (U/L)   Date Value   02/26/2021 18     ALT (U/L)   Date Value   02/26/2021 13     BUN (mg/dL)   Date Value   02/28/2021 6      (goal A1C is < 7)   (goal LDL is <100) need 30-50% reduction from baseline     BP Readings from Last 3 Encounters:   03/09/21 (!) 152/98   03/01/21 129/85   01/20/21 138/82    (goal /80)      All Future Testing planned in [facility]:  Lab Frequency Next Occurrence   CBC Once 01/22/2021   Hemoglobin and Hematocrit, Blood, Post Transfusion POST TRANSFUSION        Next Visit Date:  Future Appointments   Date Time Provider Cathryn Bella   6/10/2021  3:30 PM Butch Smith MD [facility] AT [facility] AND [facility]            Patient Active Problem List:     Closed fracture of right hip [facility])     Closed hip fracture, right, initial encounter ([facility] [address]     Acute deep vein thrombosis (DVT) of distal vein of right lower extremity (HCC)     Chronic deep vein thrombosis (DVT) of lower leg (HCC)     Elevated blood pressure reading

## 2021-06-10 ENCOUNTER — OFFICE VISIT (OUTPATIENT)
Dept: FAMILY MEDICINE CLINIC | Age: 41
End: 2021-06-10
Payer: COMMERCIAL

## 2021-06-10 VITALS
OXYGEN SATURATION: 92 % | TEMPERATURE: 98.1 F | WEIGHT: 238.2 LBS | BODY MASS INDEX: 37.31 KG/M2 | SYSTOLIC BLOOD PRESSURE: 162 MMHG | DIASTOLIC BLOOD PRESSURE: 100 MMHG | HEART RATE: 82 BPM

## 2021-06-10 DIAGNOSIS — Z80.0 FAMILY HX OF COLON CANCER: ICD-10-CM

## 2021-06-10 DIAGNOSIS — I10 ESSENTIAL HYPERTENSION: ICD-10-CM

## 2021-06-10 DIAGNOSIS — D50.9 IRON DEFICIENCY ANEMIA, UNSPECIFIED IRON DEFICIENCY ANEMIA TYPE: ICD-10-CM

## 2021-06-10 DIAGNOSIS — I82.5Z9 CHRONIC DEEP VEIN THROMBOSIS (DVT) OF LOWER LEG (HCC): Primary | ICD-10-CM

## 2021-06-10 PROBLEM — S72.001A CLOSED FRACTURE OF RIGHT HIP (HCC): Status: RESOLVED | Noted: 2021-01-17 | Resolved: 2021-06-10

## 2021-06-10 PROBLEM — R03.0 ELEVATED BLOOD PRESSURE READING: Status: RESOLVED | Noted: 2021-03-09 | Resolved: 2021-06-10

## 2021-06-10 PROBLEM — I82.4Z1 ACUTE DEEP VEIN THROMBOSIS (DVT) OF DISTAL VEIN OF RIGHT LOWER EXTREMITY (HCC): Status: RESOLVED | Noted: 2021-02-26 | Resolved: 2021-06-10

## 2021-06-10 PROCEDURE — 99214 OFFICE O/P EST MOD 30 MIN: CPT | Performed by: INTERNAL MEDICINE

## 2021-06-10 RX ORDER — LOSARTAN POTASSIUM 50 MG/1
50 TABLET ORAL DAILY
Qty: 30 TABLET | Refills: 2 | Status: SHIPPED | OUTPATIENT
Start: 2021-06-10 | End: 2021-07-08 | Stop reason: DRUGHIGH

## 2021-06-10 SDOH — ECONOMIC STABILITY: FOOD INSECURITY: WITHIN THE PAST 12 MONTHS, THE FOOD YOU BOUGHT JUST DIDN'T LAST AND YOU DIDN'T HAVE MONEY TO GET MORE.: NEVER TRUE

## 2021-06-10 SDOH — ECONOMIC STABILITY: FOOD INSECURITY: WITHIN THE PAST 12 MONTHS, YOU WORRIED THAT YOUR FOOD WOULD RUN OUT BEFORE YOU GOT MONEY TO BUY MORE.: NEVER TRUE

## 2021-06-10 ASSESSMENT — SOCIAL DETERMINANTS OF HEALTH (SDOH): HOW HARD IS IT FOR YOU TO PAY FOR THE VERY BASICS LIKE FOOD, HOUSING, MEDICAL CARE, AND HEATING?: SOMEWHAT HARD

## 2021-06-10 ASSESSMENT — ENCOUNTER SYMPTOMS
SHORTNESS OF BREATH: 0
NAUSEA: 0
COUGH: 0
SORE THROAT: 0
ABDOMINAL PAIN: 0

## 2021-06-10 NOTE — PROGRESS NOTES
HPI Notes    Name: Radha Olivo  : 1980         Chief Complaint:     Chief Complaint   Patient presents with    Hypertension     Patient presents today for 3 month check up    Other     DVT-patient reports that she is doing good and swelling in her right leg has gone down some       History of Present Illness:        Elisa Vidales  presents to office to follow-up for hypertension,anemia and right lower extremity extensive DVT. Was diagnosed with extensive RLE DVT on 21. Currently on Xarelto. States right lower extremity swelling significantly improved. Reports no black stools, no blood in stools, no easy bruising. She has a history of iron deficiency anemia. States usually has  long and heavy periods 7-8 days per month. This has been going on for many years but since starting Xarelto it's been worse. Has no abdominal pain. Never had GI work up in the past. Says  Her sister  from  colon cancer at age 44. Currently on iron replacement , tolerates well. Hypertension  This is a chronic problem. The current episode started more than 1 month ago. The problem is uncontrolled. Pertinent negatives include no chest pain, headaches, palpitations or shortness of breath. There are no associated agents to hypertension. Risk factors for coronary artery disease include obesity. Past treatments include nothing. There is no history of kidney disease, CAD/MI, CVA or heart failure. Past Medical History:     History reviewed. No pertinent past medical history.    Reviewed all health maintenance requirements and orderedappropriate tests  Health Maintenance Due   Topic Date Due    Hepatitis C screen  Never done    COVID-19 Vaccine (1) Never done    HIV screen  Never done    DTaP/Tdap/Td vaccine (1 - Tdap) Never done    Cervical cancer screen  Never done    Lipid screen  Never done       Past Surgical History:     Past Surgical History:   Procedure Laterality Date     SECTION      FEMUR FRACTURE SURGERY Right 1/17/2021    FEMUR IM NAIL PRESLEY INSERTION performed by Radha Sanchez MD at [facility] OR        Medications:       Prior to Admission medications    Medication Sig Start Date End Date Taking? Authorizing Provider   losartan (COZAAR) 50 MG tablet Take 1 tablet by mouth daily 6/10/21  Yes Prateek Echevarria MD   rivaroxaban (XARELTO) 20 MG TABS tablet Take 1 tablet by mouth daily (with breakfast) 3/9/21  Yes Prateek Echevarria MD   ferrous sulfate (IRON 325) 325 (65 Fe) MG tablet Take 1 tablet by mouth 2 times daily (with meals) 1/20/21  Yes Daniel Kendrick MD        Allergies:       Patient has no known allergies. Social History:     Tobacco: reports that she quit smoking about 4 months ago. Her smoking use included cigarettes. She smoked 0.50 packs per day. She has never used smokeless tobacco.  Alcohol:      reports current alcohol use of about 2.0 standard drinks of alcohol per week. Drug Use:  reports no history of drug use. Family History:     History reviewed. No pertinent family history. Review of Systems:         Review of Systems   Constitutional: Negative for activity change, appetite change, chills, fatigue and fever. HENT: Negative for congestion and sore throat. Respiratory: Negative for cough and shortness of breath. Cardiovascular: Negative for chest pain and palpitations. Gastrointestinal: Negative for abdominal pain and nausea. Genitourinary: Positive for vaginal bleeding. Negative for dysuria and pelvic pain. Skin: Negative for rash. Neurological: Negative for dizziness and headaches. Psychiatric/Behavioral: The patient is not nervous/anxious. Physical Exam:     Vitals:  BP (!) 162/100 (Site: Right Upper Arm, Position: Sitting, Cuff Size: Medium Adult)   Pulse 82   Temp 98.1 °F (36.7 °C)   Wt 238 lb 3.2 oz (108 kg)   SpO2 92%   BMI 37.31 kg/m²       Physical Exam  Vitals reviewed.    Constitutional:       General: She is not in acute distress. Appearance: She is well-developed. She is obese. HENT:      Head: Normocephalic and atraumatic. Right Ear: External ear normal.      Left Ear: External ear normal.   Eyes:      General: No scleral icterus. Right eye: No discharge. Left eye: No discharge. Conjunctiva/sclera: Conjunctivae normal.   Neck:      Thyroid: No thyromegaly. Cardiovascular:      Rate and Rhythm: Normal rate and regular rhythm. Heart sounds: Normal heart sounds. No murmur heard. Pulmonary:      Effort: Pulmonary effort is normal.      Breath sounds: Normal breath sounds. No wheezing or rales. Abdominal:      General: Bowel sounds are normal. There is no distension. Palpations: Abdomen is soft. There is no mass. Tenderness: There is no abdominal tenderness. Musculoskeletal:         General: No tenderness. Normal range of motion. Right lower leg: Edema present. Left lower leg: No edema. Lymphadenopathy:      Cervical: No cervical adenopathy. Skin:     General: Skin is warm and dry. Coloration: Skin is not jaundiced or pale. Findings: No rash. Neurological:      General: No focal deficit present. Mental Status: She is alert and oriented to person, place, and time. Psychiatric:         Mood and Affect: Mood normal.         Behavior: Behavior normal.         Thought Content:  Thought content normal.         Judgment: Judgment normal.               Data:     Lab Results   Component Value Date     02/28/2021    K 3.7 02/28/2021     02/28/2021    CO2 25 02/28/2021    BUN 6 02/28/2021    CREATININE 0.61 02/28/2021    GLUCOSE 93 02/28/2021    PROT 7.3 02/26/2021    LABALBU 3.9 02/26/2021    BILITOT 0.20 02/26/2021    ALKPHOS 148 02/26/2021    AST 18 02/26/2021    ALT 13 02/26/2021     Lab Results   Component Value Date    WBC 6.0 03/01/2021    RBC 4.58 03/01/2021    HGB 10.9 03/01/2021    HCT 34.5 03/01/2021    MCV 75.3 03/01/2021    MCH 23.8 03/01/2021    MCHC 31.6 03/01/2021    RDW 24.5 03/01/2021     03/01/2021    MPV NOT REPORTED 03/01/2021     No results found for: TSH  No results found for: CHOL, HDL, PSA, LABA1C       Assessment & Plan        Diagnosis Orders   1. Chronic deep vein thrombosis (DVT) of lower leg (HCC)   Continue on Xarelto    2. Essential hypertension   Will start on Losartan 50 mg/day. Follow up in 4 weeks losartan (COZAAR) 50 MG tablet   3. Iron deficiency anemia, unspecified iron deficiency anemia type   Will recheck CBC to follow on H/H. Refer to Dr. Rafael Brooks for Colonoscopy due to anemia and family hx of colon cancer in sister at age 44. Also advised to follow up with her OBGYN due to dysfunctional uterine bleeding CBC Auto Differential    Sandy Colorado MD, General Surgery, [facility]   4. Family hx of colon cancer  Sandy Colorado MD, General Surgery, [facility]                   Completed Refills   Requested Prescriptions     Signed Prescriptions Disp Refills    losartan (COZAAR) 50 MG tablet 30 tablet 2     Sig: Take 1 tablet by mouth daily     Return in about 4 weeks (around 7/8/2021) for HTN. Orders Placed This Encounter   Medications    losartan (COZAAR) 50 MG tablet     Sig: Take 1 tablet by mouth daily     Dispense:  30 tablet     Refill:  2     Orders Placed This Encounter   Procedures    CBC Auto Differential     Standing Status:   Future     Standing Expiration Date:   6/10/2022    Lyn Robb MD, General Surgery, [facility]     Referral Priority:   Routine     Referral Type:   Eval and Treat     Referral Reason:   Specialty Services Required     Referred to Provider:   Jenni Peabody, MD     Requested Specialty:   General Surgery     Number of Visits Requested:   1         Patient Instructions   SURVEY:    You may be receiving a survey from [employer] regarding your visit today.     Please complete the survey to enable us to provide the highest quality of care to you and your family. If you cannot score us a very good on any question, please call the office to discuss how we could of made your experience a very good one. Thank you. You may be receiving a survey from [employer] regarding your visit today. You may get this in the mail, through your MyChart or in your email. Please complete the survey to enable us to provide the highest quality of care to you and your family. If you cannot score us as very good ( 5 Stars) on any question, please feel free to call the office to discuss how we could have made your experience exceptional.     Thank You!       MD Claude Pandey RMA          Electronically signed by Nany Arguello MD on 6/10/2021 at 7:36 PM           Completed Refills      Requested Prescriptions     Signed Prescriptions Disp Refills    losartan (COZAAR) 50 MG tablet 30 tablet 2     Sig: Take 1 tablet by mouth daily

## 2021-06-10 NOTE — PATIENT INSTRUCTIONS
SURVEY:    You may be receiving a survey from [employer] regarding your visit today. Please complete the survey to enable us to provide the highest quality of care to you and your family. If you cannot score us a very good on any question, please call the office to discuss how we could of made your experience a very good one. Thank you. You may be receiving a survey from [employer] regarding your visit today. You may get this in the mail, through your MyChart or in your email. Please complete the survey to enable us to provide the highest quality of care to you and your family. If you cannot score us as very good ( 5 Stars) on any question, please feel free to call the office to discuss how we could have made your experience exceptional.     Thank You!       MD Adal Gloria RMA

## 2021-07-06 NOTE — TELEPHONE ENCOUNTER
----- Message from Avel Hamilton sent at 7/2/2021  4:09 PM EDT -----  Subject: Medication Problem    QUESTIONS  Name of Medication? rivaroxaban (XARELTO) 20 MG TABS tablet  Patient-reported dosage and instructions? 20 mg tabs  What question or problem do you have with the medication? Wanting a   generic brand for this medication due to no longer having insurance the   price is too high. Preferred Pharmacy? [facility] phone number (if available)? [phone]  Additional Information for Provider?   ---------------------------------------------------------------------------  --------------  CALL BACK INFO  What is the best way for the office to contact you? OK to leave message on   voicemail  Preferred Call Back Phone Number? [phone]  ---------------------------------------------------------------------------  --------------  SCRIPT ANSWERS  Relationship to Patient?  Self

## 2021-07-08 ENCOUNTER — OFFICE VISIT (OUTPATIENT)
Dept: FAMILY MEDICINE CLINIC | Age: 41
End: 2021-07-08
Payer: COMMERCIAL

## 2021-07-08 ENCOUNTER — TELEPHONE (OUTPATIENT)
Dept: PHARMACY | Age: 41
End: 2021-07-08

## 2021-07-08 VITALS
TEMPERATURE: 98 F | SYSTOLIC BLOOD PRESSURE: 140 MMHG | OXYGEN SATURATION: 100 % | DIASTOLIC BLOOD PRESSURE: 92 MMHG | HEART RATE: 81 BPM

## 2021-07-08 DIAGNOSIS — I82.5Z9 CHRONIC DEEP VEIN THROMBOSIS (DVT) OF LOWER LEG (HCC): ICD-10-CM

## 2021-07-08 DIAGNOSIS — I10 ESSENTIAL HYPERTENSION: Primary | ICD-10-CM

## 2021-07-08 PROCEDURE — 99213 OFFICE O/P EST LOW 20 MIN: CPT | Performed by: INTERNAL MEDICINE

## 2021-07-08 RX ORDER — LOSARTAN POTASSIUM 100 MG/1
100 TABLET ORAL DAILY
Qty: 30 TABLET | Refills: 5 | Status: SHIPPED | OUTPATIENT
Start: 2021-07-08 | End: 2021-10-12 | Stop reason: SDUPTHER

## 2021-07-08 RX ORDER — WARFARIN SODIUM 5 MG/1
5 TABLET ORAL DAILY
Qty: 10 TABLET | Refills: 0 | Status: SHIPPED | OUTPATIENT
Start: 2021-07-08 | End: 2021-07-15 | Stop reason: DRUGHIGH

## 2021-07-08 RX ORDER — FERROUS SULFATE 325(65) MG
325 TABLET ORAL 2 TIMES DAILY WITH MEALS
Qty: 60 TABLET | Refills: 3 | Status: SHIPPED | OUTPATIENT
Start: 2021-07-08 | End: 2021-10-12 | Stop reason: SDUPTHER

## 2021-07-08 ASSESSMENT — ENCOUNTER SYMPTOMS
SORE THROAT: 0
ABDOMINAL PAIN: 0
NAUSEA: 0
COUGH: 0
SHORTNESS OF BREATH: 0

## 2021-07-08 NOTE — TELEPHONE ENCOUNTER
Received a call from Dr. Timo Ramirez regarding anticoagulation therapy for this patient. She had developed extensive RLE DVT on 2/26/2021 s/p right femur IM nail dulce insertion on 1/17/2021 per Dr. Argentina Vaughan. She was hospitalized here at [facility] from 2/26/2021 through 3/1/2021 and initiated on Xarelto 15 mg BID x 21 days, followed by 20 mg once daily. Dr. Timo Ramirez says she followed up in her office today and says that she stopped taking the Xarelto \"about a week ago\" because she ran out and can't afford a new prescription. Dr. Timo Ramirez would like to refer him to the Medication Management clinic and start her on warfarin therapy for anticoagulation. She says she does not have insurance so I recommended that she apply for patient assistance through [facility]. Dr. Timo Ramirez says she will have the patient call for application for patient assistance. Dr. Timo Ramirez says she will order 5 mg warfarin daily for the patient to start today (7/8/2021) and would like the patient seen in the clinic 1 week after starting therapy for INR check.  We will schedule the patient for INR check on Thursday, 7/15/2021 at 10 AM.

## 2021-07-08 NOTE — PROGRESS NOTES
HPI Notes    Name: Temitope Omer  : 1980         Chief Complaint:     Chief Complaint   Patient presents with    Hypertension     patient presents today 1 month for check up        History of Present Illness:        Apurva Lloyd presents to office to follow up for HTN.and RLE DVT    States stopped taking Xarelto due to inability to pay for it  Has no health Insurance  Would like to try something generic     Was started on Losartan about one month ago. sattes has been taking every day      Hypertension  This is a chronic problem. The current episode started more than 1 month ago. The problem has been gradually improving since onset. The problem is uncontrolled. Pertinent negatives include no anxiety, chest pain, headaches, palpitations or shortness of breath. There are no associated agents to hypertension. Risk factors for coronary artery disease include smoking/tobacco exposure. Past treatments include angiotensin blockers. The current treatment provides moderate improvement. There are no compliance problems. There is no history of kidney disease, CAD/MI or CVA. Past Medical History:     History reviewed. No pertinent past medical history. Reviewed all health maintenance requirements and orderedappropriate tests  Health Maintenance Due   Topic Date Due    Hepatitis C screen  Never done    COVID-19 Vaccine (1) Never done    HIV screen  Never done    DTaP/Tdap/Td vaccine (1 - Tdap) Never done    Cervical cancer screen  Never done    Lipid screen  Never done       Past Surgical History:     Past Surgical History:   Procedure Laterality Date     SECTION      FEMUR FRACTURE SURGERY Right 2021    FEMUR IM NAIL PRESLEY INSERTION performed by Alden Homans, MD at [facility] OR        Medications:       Prior to Admission medications    Medication Sig Start Date End Date Taking?  Authorizing Provider   ferrous sulfate (IRON 325) 325 (65 Fe) MG tablet Take 1 tablet by mouth 2 times daily (with meals) 7/8/21  Yes Samuel Aranda MD   losartan (COZAAR) 100 MG tablet Take 1 tablet by mouth daily 7/8/21  Yes Samuel Aranda MD   warfarin (COUMADIN) 5 MG tablet Take 1 tablet by mouth daily 7/8/21  Yes Samuel Aranda MD        Allergies:       Patient has no known allergies. Social History:     Tobacco: reports that she quit smoking about 5 months ago. Her smoking use included cigarettes. She smoked 0.50 packs per day. She has never used smokeless tobacco.  Alcohol:      reports current alcohol use of about 2.0 standard drinks of alcohol per week. Drug Use:  reports no history of drug use. Family History:     History reviewed. No pertinent family history. Review of Systems:         Review of Systems   Constitutional: Negative for chills and fever. HENT: Negative for congestion and sore throat. Respiratory: Negative for cough and shortness of breath. Cardiovascular: Negative for chest pain and palpitations. Gastrointestinal: Negative for abdominal pain and nausea. Genitourinary: Negative for dysuria. Skin: Negative for rash. Neurological: Negative for dizziness and headaches. Psychiatric/Behavioral: The patient is not nervous/anxious. Physical Exam:     Vitals:  BP (!) 140/92   Pulse 81   Temp 98 °F (36.7 °C)   SpO2 100%       Physical Exam  Vitals reviewed. Constitutional:       General: She is not in acute distress. Appearance: She is well-developed. HENT:      Head: Normocephalic and atraumatic. Neck:      Thyroid: No thyromegaly. Cardiovascular:      Rate and Rhythm: Normal rate and regular rhythm. Heart sounds: Normal heart sounds. No murmur heard. Pulmonary:      Effort: Pulmonary effort is normal.      Breath sounds: Normal breath sounds. No wheezing or rales. Abdominal:      General: Bowel sounds are normal. There is no distension. Palpations: Abdomen is soft. There is no mass. Tenderness: There is no abdominal tenderness. Musculoskeletal:         General: Normal range of motion. Right lower leg: Edema present. Left lower leg: No edema. Lymphadenopathy:      Cervical: No cervical adenopathy. Skin:     General: Skin is warm and dry. Coloration: Skin is not jaundiced or pale. Findings: No rash. Neurological:      General: No focal deficit present. Mental Status: She is alert and oriented to person, place, and time. Mental status is at baseline. Psychiatric:         Mood and Affect: Mood normal.         Behavior: Behavior normal.         Thought Content: Thought content normal.         Judgment: Judgment normal.               Data:     Lab Results   Component Value Date     02/28/2021    K 3.7 02/28/2021     02/28/2021    CO2 25 02/28/2021    BUN 6 02/28/2021    CREATININE 0.61 02/28/2021    GLUCOSE 93 02/28/2021    PROT 7.3 02/26/2021    LABALBU 3.9 02/26/2021    BILITOT 0.20 02/26/2021    ALKPHOS 148 02/26/2021    AST 18 02/26/2021    ALT 13 02/26/2021     Lab Results   Component Value Date    WBC 6.0 03/01/2021    RBC 4.58 03/01/2021    HGB 10.9 03/01/2021    HCT 34.5 03/01/2021    MCV 75.3 03/01/2021    MCH 23.8 03/01/2021    MCHC 31.6 03/01/2021    RDW 24.5 03/01/2021     03/01/2021    MPV NOT REPORTED 03/01/2021     No results found for: TSH  No results found for: CHOL, HDL, PSA, LABA1C       Assessment & Plan        Diagnosis Orders   1. Essential hypertension   Blood pressure still elevated, increase losartan from 50mg to 100 mg/day. Follow-up in 3 months losartan (COZAAR) 100 MG tablet   2. Chronic deep vein thrombosis (DVT) of lower leg (HCC)   Patient unable to afford Xarelto and stopped taking about a week ago. Prescribed Coumadin. Spoke with pharmacist Jarrod at [facility] and will refer the patient to Coumadin clinic to follow-up with PT and INR and Coumadin dosing. Unfortunately she will not be able to afford Lovenox for bridging.   We will follow-up in 3 months warfarin (COUMADIN) 5 MG tablet                   Completed Refills   Requested Prescriptions     Signed Prescriptions Disp Refills    ferrous sulfate (IRON 325) 325 (65 Fe) MG tablet 60 tablet 3     Sig: Take 1 tablet by mouth 2 times daily (with meals)    losartan (COZAAR) 100 MG tablet 30 tablet 5     Sig: Take 1 tablet by mouth daily    warfarin (COUMADIN) 5 MG tablet 10 tablet 0     Sig: Take 1 tablet by mouth daily     Return in about 3 months (around 10/8/2021), or DVT RLE, for HTN. Orders Placed This Encounter   Medications    ferrous sulfate (IRON 325) 325 (65 Fe) MG tablet     Sig: Take 1 tablet by mouth 2 times daily (with meals)     Dispense:  60 tablet     Refill:  3    losartan (COZAAR) 100 MG tablet     Sig: Take 1 tablet by mouth daily     Dispense:  30 tablet     Refill:  5    warfarin (COUMADIN) 5 MG tablet     Sig: Take 1 tablet by mouth daily     Dispense:  10 tablet     Refill:  0     No orders of the defined types were placed in this encounter. There are no Patient Instructions on file for this visit.     Electronically signed by Cezar Pelayo MD on 7/8/2021 at 1:52 PM           Completed Refills      Requested Prescriptions     Signed Prescriptions Disp Refills    ferrous sulfate (IRON 325) 325 (65 Fe) MG tablet 60 tablet 3     Sig: Take 1 tablet by mouth 2 times daily (with meals)    losartan (COZAAR) 100 MG tablet 30 tablet 5     Sig: Take 1 tablet by mouth daily    warfarin (COUMADIN) 5 MG tablet 10 tablet 0     Sig: Take 1 tablet by mouth daily

## 2021-07-14 ENCOUNTER — TELEPHONE (OUTPATIENT)
Dept: PHARMACY | Age: 41
End: 2021-07-14

## 2021-07-14 NOTE — TELEPHONE ENCOUNTER
Called to remind pt of 1st coumadin clinic appt, phone goes to  then rings a busy tone and will not let you leave a message 7/14/21 co

## 2021-07-15 ENCOUNTER — HOSPITAL ENCOUNTER (OUTPATIENT)
Dept: PHARMACY | Age: 41
Setting detail: THERAPIES SERIES
Discharge: HOME | End: 2021-07-15

## 2021-07-15 VITALS
BODY MASS INDEX: 37.37 KG/M2 | SYSTOLIC BLOOD PRESSURE: 128 MMHG | WEIGHT: 238.6 LBS | DIASTOLIC BLOOD PRESSURE: 92 MMHG | HEART RATE: 69 BPM

## 2021-07-15 DIAGNOSIS — I82.5Z9 CHRONIC DEEP VEIN THROMBOSIS (DVT) OF LOWER LEG (HCC): Primary | ICD-10-CM

## 2021-07-15 LAB — INR BLD: 1.5

## 2021-07-15 PROCEDURE — 85610 PROTHROMBIN TIME: CPT

## 2021-07-15 PROCEDURE — 99213 OFFICE O/P EST LOW 20 MIN: CPT

## 2021-07-15 RX ORDER — WARFARIN SODIUM 5 MG/1
TABLET ORAL
Qty: 50 TABLET | Refills: 3 | OUTPATIENT
Start: 2021-07-15 | End: 2021-07-15 | Stop reason: SDUPTHER

## 2021-07-15 RX ORDER — WARFARIN SODIUM 5 MG/1
TABLET ORAL
Qty: 50 TABLET | Refills: 3 | OUTPATIENT
Start: 2021-07-15 | End: 2021-07-19 | Stop reason: DRUGHIGH

## 2021-07-15 NOTE — PROGRESS NOTES
[facility]  Medication Management  ANTICOAGULATION    Referring Provider: Dr. Corine Stephenson INR: 2.0-3.0    TODAY'S INR: 1.5    WARFARIN Dosage: 7.5 mg Thur and Sun and 5 mg daily all other days of the week     INR (no units)   Date Value   07/15/2021 1.5   2021 1.0       Medication changes:  none  Notes:    Initial visit. Extensive education provided regarding indication, mechanism of action, duration of action, what to do if miss a dose, signs and symptoms of bleeding, OTC approved pain relievers, drug-drug and diet interactions. All questions answered. Fingerstick INR drawn per clinic protocol. Patient states no visible blood in urine and no black tarry stool. Denies any missed doses of warfarin. No change in other maintenance medications or in diet. Apurva Lloyd had RLE DVT on 2021. She was previously anticoagulated on xarelto but ran out and couldn't afford the prescription. Dr. Dominga Rivers prescribed warfarin. Apurva Lloyd has had 6 doses of warfarin 5 mg prior to today's INR check. Since Geoff's INR is subtherapeutic, we will increase weekly warfarin dosage by 14% and will recheck INR in 4 days. Patient acknowledges working in consult agreement with pharmacist as referred by his/her physician.                   For Pharmacy Admin Tracking Only     Intervention Detail: Adherence Monitorin, Dose Adjustment: 1, reason: Therapy Optimization, New Rx: 1, reason: Needs Additional Therapy and Refill(s) Provided   Total # of Interventions Recommended: 2   Total # of Interventions Accepted: 2   Time Spent (min): [address], [facility], PharmD

## 2021-07-19 ENCOUNTER — HOSPITAL ENCOUNTER (OUTPATIENT)
Dept: PHARMACY | Age: 41
Setting detail: THERAPIES SERIES
Discharge: HOME | End: 2021-07-19

## 2021-07-19 VITALS
HEART RATE: 80 BPM | WEIGHT: 239 LBS | SYSTOLIC BLOOD PRESSURE: 143 MMHG | DIASTOLIC BLOOD PRESSURE: 94 MMHG | BODY MASS INDEX: 37.43 KG/M2

## 2021-07-19 DIAGNOSIS — I82.5Z9 CHRONIC DEEP VEIN THROMBOSIS (DVT) OF LOWER LEG (HCC): Primary | ICD-10-CM

## 2021-07-19 LAB — INR BLD: 1.6

## 2021-07-19 PROCEDURE — 99212 OFFICE O/P EST SF 10 MIN: CPT

## 2021-07-19 PROCEDURE — 85610 PROTHROMBIN TIME: CPT

## 2021-07-19 RX ORDER — WARFARIN SODIUM 5 MG/1
TABLET ORAL
Qty: 50 TABLET | Refills: 3 | Status: SHIPPED
Start: 2021-07-19 | End: 2021-07-22 | Stop reason: DRUGHIGH

## 2021-07-19 NOTE — PROGRESS NOTES
[facility]  Medication Management  ANTICOAGULATION    Referring Provider: Dr. Rm Meeks INR: 2.0-3.0    TODAY'S INR: 1.6    WARFARIN Dosage: 7.5 mg daily    INR (no units)   Date Value   2021 1.6   07/15/2021 1.5   2021 1.0       Medication changes:  none  Notes:    Fingerstick INR drawn per clinic protocol. Patient states no visible blood in urine and no black tarry stool. Denies any missed doses of warfarin. No change in other maintenance medications or in diet. Carla Stewart has not taken her BP med yet today. Despite a 14.4% increase in warfarin dosage, Geoff's INR is still subtherapeutic, so we will increase weekly warfarin dosage by 18.8% and will recheck INR in 3 days. Patient acknowledges working in consult agreement with pharmacist as referred by his/her physician.                   For Pharmacy Admin Tracking Only     Intervention Detail: Adherence Monitorin and Dose Adjustment: 1, reason: Therapy Optimization   Total # of Interventions Recommended: 1   Total # of Interventions Accepted: 1   Time Spent (min): [address], [facility], PharmD

## 2021-07-22 ENCOUNTER — HOSPITAL ENCOUNTER (OUTPATIENT)
Dept: PHARMACY | Age: 41
Setting detail: THERAPIES SERIES
Discharge: HOME | End: 2021-07-22

## 2021-07-22 VITALS
BODY MASS INDEX: 37.31 KG/M2 | SYSTOLIC BLOOD PRESSURE: 145 MMHG | HEART RATE: 76 BPM | DIASTOLIC BLOOD PRESSURE: 90 MMHG | WEIGHT: 238.2 LBS

## 2021-07-22 DIAGNOSIS — I82.5Z9 CHRONIC DEEP VEIN THROMBOSIS (DVT) OF LOWER LEG (HCC): Primary | ICD-10-CM

## 2021-07-22 LAB — INR BLD: 2.7

## 2021-07-22 PROCEDURE — 99211 OFF/OP EST MAY X REQ PHY/QHP: CPT

## 2021-07-22 PROCEDURE — 85610 PROTHROMBIN TIME: CPT

## 2021-07-22 RX ORDER — WARFARIN SODIUM 5 MG/1
TABLET ORAL
Qty: 50 TABLET | Refills: 3 | Status: SHIPPED
Start: 2021-07-22 | End: 2021-07-28 | Stop reason: DRUGHIGH

## 2021-07-22 NOTE — PROGRESS NOTES
[facility] [address] [place] [address]/[place]  Medication Management  ANTICOAGULATION    Referring Provider: Dr. Héctor Carreno INR: 2.0-3.0    TODAY'S INR: 2.7    WARFARIN Dosage: 7.5 mg Mon,Wed and Fri and 5 mg daily all other days of the week     INR (no units)   Date Value   2021 2.7   2021 1.6   07/15/2021 1.5   2021 1.0       Medication changes:  none  Notes:    Fingerstick INR drawn per clinic protocol. Patient states no visible blood in urine and no black tarry stool. Denies any missed doses of warfarin. No change in other maintenance medications or in diet. Naila Clay has had 13 doses of warfarin prior to today's INR check. Since Geoff's INR is therapeutic, but has increased from 1.6 to 2.7 over the last 3 days, we will decrease weekly warfarin dosage by 10.5% and will recheck INR in 1 weeks. Patient acknowledges working in consult agreement with pharmacist as referred by his/her physician.                   For Pharmacy Admin Tracking Only     Intervention Detail: Adherence Monitorin and Dose Adjustment: 1, reason: Therapy De-escalation   Total # of Interventions Recommended: 1   Total # of Interventions Accepted: 1   Time Spent (min): [address], [facility], PharmD

## 2021-07-28 ENCOUNTER — HOSPITAL ENCOUNTER (OUTPATIENT)
Dept: PHARMACY | Age: 41
Setting detail: THERAPIES SERIES
Discharge: HOME | End: 2021-07-28

## 2021-07-28 VITALS
SYSTOLIC BLOOD PRESSURE: 119 MMHG | BODY MASS INDEX: 36.81 KG/M2 | WEIGHT: 235 LBS | DIASTOLIC BLOOD PRESSURE: 78 MMHG | HEART RATE: 75 BPM

## 2021-07-28 DIAGNOSIS — I82.5Z9 CHRONIC DEEP VEIN THROMBOSIS (DVT) OF LOWER LEG (HCC): Primary | ICD-10-CM

## 2021-07-28 LAB — INR BLD: 1.7

## 2021-07-28 PROCEDURE — 99212 OFFICE O/P EST SF 10 MIN: CPT

## 2021-07-28 PROCEDURE — 85610 PROTHROMBIN TIME: CPT

## 2021-07-28 RX ORDER — WARFARIN SODIUM 5 MG/1
TABLET ORAL
Qty: 50 TABLET | Refills: 3 | Status: SHIPPED
Start: 2021-07-28 | End: 2021-08-04 | Stop reason: DRUGHIGH

## 2021-07-28 NOTE — PROGRESS NOTES
[facility] [address] [place] [address]/[place]  Medication Management  ANTICOAGULATION    Referring Provider: Dr. Corky Allen     GOAL INR: 2.0-3.0     TODAY'S INR: 1.7     WARFARIN Dosage: 10 mg x 1 dose 7.5 mg Mon,Wed, Fri, and Sat and 5 mg daily all other days of the week     INR (no units)   Date Value   2021 1.7   2021 2.7   2021 1.6   07/15/2021 1.5   2021 1.0       Medication changes:  none  Notes:    Fingerstick INR drawn per clinic protocol. Patient states no visible blood in urine and no black tarry stool. Denies any missed doses of warfarin. No change in other maintenance medications or in diet. Maria Luz Noel has had 19 doses of warfarin prior to today's INR check. Since Geoff's INR has decreased from 2.7 to 1.7 and is subtherapeutic today, we will have her take warfarin 10 mg tonight and then increase weekly warfarin dosage by 5.9% and will recheck INR in 1 weeks. Patient acknowledges working in consult agreement with pharmacist as referred by his/her physician.                   For Pharmacy Admin Tracking Only     Intervention Detail: Adherence Monitorin and Dose Adjustment: 1, reason: Therapy Optimization   Total # of Interventions Recommended: 1   Total # of Interventions Accepted: 1   Time Spent (min): [address], [facility], PharmD

## 2021-07-28 NOTE — PATIENT INSTRUCTIONS
Boost today's dosage and then increase current dose of warfarin as instructed on dosing calendar provided. Continue to monitor urine and stool for signs and symptoms of bleeding. Please notify the clinic of any medication changes. Please remember to bring all medications (both prescription and OTC) to your next visit. Kindly notify the clinic if you are unable to make to your next appointment.

## 2021-08-04 ENCOUNTER — HOSPITAL ENCOUNTER (OUTPATIENT)
Dept: PHARMACY | Age: 41
Setting detail: THERAPIES SERIES
Discharge: HOME | End: 2021-08-04

## 2021-08-04 VITALS
WEIGHT: 238 LBS | SYSTOLIC BLOOD PRESSURE: 133 MMHG | BODY MASS INDEX: 37.28 KG/M2 | DIASTOLIC BLOOD PRESSURE: 95 MMHG | HEART RATE: 63 BPM

## 2021-08-04 DIAGNOSIS — I82.5Z9 CHRONIC DEEP VEIN THROMBOSIS (DVT) OF LOWER LEG (HCC): Primary | ICD-10-CM

## 2021-08-04 LAB — INR BLD: 1.6

## 2021-08-04 PROCEDURE — 99211 OFF/OP EST MAY X REQ PHY/QHP: CPT

## 2021-08-04 PROCEDURE — 85610 PROTHROMBIN TIME: CPT

## 2021-08-04 PROCEDURE — 99212 OFFICE O/P EST SF 10 MIN: CPT

## 2021-08-04 RX ORDER — WARFARIN SODIUM 5 MG/1
TABLET ORAL
Qty: 50 TABLET | Refills: 3 | Status: SHIPPED
Start: 2021-08-04 | End: 2021-08-19 | Stop reason: DRUGHIGH

## 2021-08-04 RX ORDER — WARFARIN SODIUM 5 MG/1
TABLET ORAL
Qty: 50 TABLET | Refills: 3 | Status: SHIPPED
Start: 2021-08-04 | End: 2021-08-04 | Stop reason: DRUGHIGH

## 2021-08-04 NOTE — PROGRESS NOTES
[facility]/[place]  Medication Management  ANTICOAGULATION    Referring Provider: Dr. Titi Sullivan     GOAL INR: 2.0-3.0     TODAY'S INR: 1.6    WARFARIN Dosage: 12.5 mg x 1 dose, then 5 mg Tues, Thur and Sat and 7.5 mg daily all other days of the week     INR (no units)   Date Value   2021 1.6   2021 1.7   2021 2.7   2021 1.6   07/15/2021 1.5   2021 1.0       Medication changes:  none  Notes:    Fingerstick INR drawn per clinic protocol. Patient states no visible blood in urine and no black tarry stool. Geoff missed 1 dose of warfarin last Friday (16.7% of weekly warfarin dosage). No change in other maintenance medications or in diet. Since Geoff's INR is subtherapeutic, likely due to missed dosage, we will have her take warfarin 12.5 mg tonight then will continue current weekly warfarin regimen and will recheck INR in 2 weeks. Patient acknowledges working in consult agreement with pharmacist as referred by his/her physician.                   For Pharmacy Admin Tracking Only     Intervention Detail: Adherence Monitorin   Total # of Interventions Recommended: 0   Total # of Interventions Accepted: 0   Time Spent (min): [address], [facility], PharmD

## 2021-08-18 ENCOUNTER — TELEPHONE (OUTPATIENT)
Dept: PHARMACY | Age: 41
End: 2021-08-18

## 2021-08-18 NOTE — TELEPHONE ENCOUNTER
COVID-19 phone screening     Call placed to screen patient prior to upcoming Medication Management visit for Anticoagulation on 8/19/21. Does patient have any of the following symptoms?   [] Fever    [] Lower respiratory symptoms (SOB, difficulty breathing, cough)  [] None      Left message for patient

## 2021-08-19 ENCOUNTER — HOSPITAL ENCOUNTER (OUTPATIENT)
Dept: PHARMACY | Age: 41
Setting detail: THERAPIES SERIES
Discharge: HOME | End: 2021-08-19

## 2021-08-19 VITALS — SYSTOLIC BLOOD PRESSURE: 121 MMHG | HEART RATE: 86 BPM | DIASTOLIC BLOOD PRESSURE: 91 MMHG

## 2021-08-19 DIAGNOSIS — I82.5Z9 CHRONIC DEEP VEIN THROMBOSIS (DVT) OF LOWER LEG (HCC): Primary | ICD-10-CM

## 2021-08-19 LAB — INR BLD: 1.8

## 2021-08-19 PROCEDURE — 85610 PROTHROMBIN TIME: CPT

## 2021-08-19 PROCEDURE — 99211 OFF/OP EST MAY X REQ PHY/QHP: CPT

## 2021-08-19 RX ORDER — WARFARIN SODIUM 5 MG/1
TABLET ORAL
Qty: 50 TABLET | Refills: 3 | Status: SHIPPED
Start: 2021-08-19 | End: 2021-10-05 | Stop reason: DRUGHIGH

## 2021-08-19 RX ORDER — ACETAMINOPHEN 500 MG
1000 TABLET ORAL 2 TIMES DAILY PRN
COMMUNITY

## 2021-08-19 NOTE — PROGRESS NOTES
[facility] [address] [place] [address]/[place]  Medication Management  ANTICOAGULATION    Referring Provider: Dr. Samuel Aranda     GOAL INR: 2.0-3.0     TODAY'S INR: 1.8     WARFARIN Dosage: 12.5 mg x 1 dose, then 5 mg Sun/Wed and 7.5 mg all other days of the week      INR (no units)   Date Value   2021 1.8   2021 1.6   2021 1.7   2021 2.7   2021 1.6   07/15/2021 1.5   2021 1.0       Medication changes:  None    Notes:    Fingerstick INR drawn per clinic protocol. Patient states no visible blood in urine and no black tarry stool. Denies any missed doses of warfarin. No change in other maintenance medications or in diet. Christiana Ang says she smokes \"about 2 or 3\" cigarettes daily, but says she has been doing this consistently since before she started warfarin therapy. Since her INR remains slightly sub-therapeutic, we will have her take 12.5 mg warfarin x 1 booster dose and then increase current weekly warfarin regimen (5.6%) as noted on her dosing calendar. She will take 5 mg on Sundays and  and 7.5 mg all other days of the week and we will recheck INR in 2 weeks. Patient acknowledges working in consult agreement with pharmacist as referred by his/her physician.                   For Pharmacy Admin Tracking Only     Intervention Detail: Adherence Monitorin and Dose Adjustment: 1, reason: Therapy Optimization   Total # of Interventions Recommended: 2   Total # of Interventions Accepted: 2   Time Spent (min): [address] Shahida Dinh, [address] [place] [address], PharmD

## 2021-09-02 ENCOUNTER — HOSPITAL ENCOUNTER (OUTPATIENT)
Dept: PHARMACY | Age: 41
Setting detail: THERAPIES SERIES
Discharge: HOME | End: 2021-09-02

## 2021-09-02 VITALS
SYSTOLIC BLOOD PRESSURE: 133 MMHG | BODY MASS INDEX: 37.68 KG/M2 | WEIGHT: 240.6 LBS | DIASTOLIC BLOOD PRESSURE: 75 MMHG | HEART RATE: 63 BPM

## 2021-09-02 DIAGNOSIS — I82.5Z9 CHRONIC DEEP VEIN THROMBOSIS (DVT) OF LOWER LEG (HCC): Primary | ICD-10-CM

## 2021-09-02 LAB — INR BLD: 3.3

## 2021-09-02 PROCEDURE — 99211 OFF/OP EST MAY X REQ PHY/QHP: CPT

## 2021-09-02 PROCEDURE — 85610 PROTHROMBIN TIME: CPT

## 2021-09-02 RX ORDER — ACETAMINOPHEN, GUAIFENESIN, AND PHENYLEPHRINE HYDROCHLORIDE 325; 200; 5 MG/1; MG/1; MG/1
TABLET, FILM COATED ORAL PRN
COMMUNITY
End: 2021-10-12 | Stop reason: ALTCHOICE

## 2021-09-02 NOTE — PROGRESS NOTES
[facility]/[place]  Medication Management  ANTICOAGULATION    Referring Provider: Dr. Deonna Quevedo     GOAL INR: 2.0-3.0     TODAY'S INR: 3.3     WARFARIN Dosage: 5 mg x 1 dose, then 5 mg Sun/Wed and 7.5 mg all other days of the week      INR (no units)   Date Value   2021 3.3   2021 1.8   2021 1.6   2021 1.7   2021 2.7   2021 1.6   07/15/2021 1.5   2021 1.0       Medication changes:  None    Notes:    Fingerstick INR drawn per clinic protocol. Patient states no visible blood in urine and no black tarry stool. Denies any missed doses of warfarin. Samuel Gunn says she has been taking \"Mucinex severe cough and cold\" allergy medication since \"last week sometime\". She says she took another dose this morning. She also admits to having had \"2 beers\" about \"4 days ago\", but says she is still smoking about \"2 or 3\" cigarettes daily at this time. No change in other maintenance medications or in diet. Since her INR is slightly supra-therapeutic today, we will have her take 5 mg warfarin tonight, but then resume 5 mg warfarin on  and Wednesday and 7.5 mg all other days of the week as noted on her dosing calendar. We will recheck INR in 2 weeks. Patient acknowledges working in consult agreement with pharmacist as referred by his/her physician.                   For Pharmacy Admin Tracking Only     Intervention Detail: Adherence Monitorin and Dose Adjustment: 1, reason: Therapy Optimization   Total # of Interventions Recommended: 2   Total # of Interventions Accepted: 2   Time Spent (min): [address] [place] [address], [address] [place] [address], PharmD

## 2021-09-07 ENCOUNTER — ANTI-COAG VISIT (OUTPATIENT)
Dept: PHARMACY | Age: 41
End: 2021-09-07

## 2021-09-14 ENCOUNTER — HOSPITAL ENCOUNTER (OUTPATIENT)
Dept: PHARMACY | Age: 41
Setting detail: THERAPIES SERIES
Discharge: HOME | End: 2021-09-14

## 2021-09-14 VITALS
BODY MASS INDEX: 37.18 KG/M2 | WEIGHT: 237.4 LBS | SYSTOLIC BLOOD PRESSURE: 150 MMHG | HEART RATE: 79 BPM | DIASTOLIC BLOOD PRESSURE: 95 MMHG

## 2021-09-14 DIAGNOSIS — I82.5Z9 CHRONIC DEEP VEIN THROMBOSIS (DVT) OF LOWER LEG (HCC): Primary | ICD-10-CM

## 2021-09-14 LAB — INR BLD: 2.8

## 2021-09-14 PROCEDURE — 85610 PROTHROMBIN TIME: CPT

## 2021-09-14 PROCEDURE — 99211 OFF/OP EST MAY X REQ PHY/QHP: CPT

## 2021-09-14 NOTE — PROGRESS NOTES
[facility] [address] [place] [address]/[place]  Medication Management  ANTICOAGULATION    Referring Provider: Dr. Vi Nina     GOAL INR: 2.0-3.0     TODAY'S INR: 2.8     WARFARIN Dosage: 5 mg Sun/Wed and 7.5 mg all other days of the     INR (no units)   Date Value   2021 2.8   2021 3.3   2021 1.8   2021 1.6   2021 1.7   2021 2.7   2021 1.6       Medication changes:  none  Notes:    Fingerstick INR drawn per clinic protocol. Patient states no visible blood in urine and no black tarry stool. Annie Arriaza states her period is \"longer than normal.\" Annie Arriaza missed 1 dose of warfarin on 9/3. No change in other maintenance medications or in diet. Since Lenoras INR is therapeutic, we will continue current weekly warfarin regimen and will recheck INR in 3 weeks. Patient acknowledges working in consult agreement with pharmacist as referred by his/her physician.                   For Pharmacy Admin Tracking Only     Intervention Detail: Adherence Monitorin   Total # of Interventions Recommended: 0   Total # of Interventions Accepted: 0   Time Spent (min): [address], [facility], PharmD

## 2021-10-04 ENCOUNTER — TELEPHONE (OUTPATIENT)
Dept: PHARMACY | Age: 41
End: 2021-10-04

## 2021-10-04 NOTE — TELEPHONE ENCOUNTER
COVID-19 phone screening     Call placed to screen patient prior to upcoming Medication Management visit for Anticoagulation. Does patient have fever and/or lower respiratory symptoms (SOB, difficulty breathing, cough)? [] Yes    [] No    If yes, complete Travel Screening and ask the following:   [] [Fever OR s/sxs of lower respiratory illness]  AND  [close contact with lab-confirmed COVID-19 patient within 14 days of symptom onset   [] [Fever AND s/sxs of lower respiratory illness requiring hospitalization] AND [history of travel to [place], [place], [place], [facility], [place] within 14 days of sx onset]  [] [Fever with severe acute lower respiratory illness (I.e. PNA, ARDS) requiring hospitalization and without alternative explanatory diagnosis (I.e. Influenza)] AND [no source of exposure identified]    [x] None of the following; patient confirmed for regularly scheduled INR check and instructed to call the clinic immediately if any symptoms develop prior to upcoming appointment.

## 2021-10-05 ENCOUNTER — HOSPITAL ENCOUNTER (OUTPATIENT)
Dept: PHARMACY | Age: 41
Setting detail: THERAPIES SERIES
Discharge: HOME | End: 2021-10-05

## 2021-10-05 VITALS
BODY MASS INDEX: 37.65 KG/M2 | DIASTOLIC BLOOD PRESSURE: 88 MMHG | HEART RATE: 66 BPM | WEIGHT: 240.4 LBS | SYSTOLIC BLOOD PRESSURE: 129 MMHG

## 2021-10-05 DIAGNOSIS — I82.5Z9 CHRONIC DEEP VEIN THROMBOSIS (DVT) OF LOWER LEG (HCC): Primary | ICD-10-CM

## 2021-10-05 LAB — INR BLD: 1.3

## 2021-10-05 PROCEDURE — 99211 OFF/OP EST MAY X REQ PHY/QHP: CPT

## 2021-10-05 PROCEDURE — 85610 PROTHROMBIN TIME: CPT

## 2021-10-05 RX ORDER — WARFARIN SODIUM 5 MG/1
TABLET ORAL
Qty: 50 TABLET | Refills: 3 | Status: SHIPPED
Start: 2021-10-05 | End: 2021-10-12 | Stop reason: ALTCHOICE

## 2021-10-05 NOTE — PROGRESS NOTES
[facility] [address] [place] [address]/[place]  Medication Management  ANTICOAGULATION    Referring Provider: Dr. Desirae Alston     GOAL INR: 2.0-3.0     TODAY'S INR: 1.3     WARFARIN Dosage: 10 mg x 2 doses, then 5 mg Sun and 7.5 mg all other days of the week     INR (no units)   Date Value   10/05/2021 1.3   2021 2.8   2021 3.3   2021 1.8   2021 1.6   2021 1.7   2021 2.7       Medication changes:  None    Notes:    Fingerstick INR drawn per clinic protocol. Patient states no visible blood in urine and no black tarry stool. Estephania Boswell says she didn't realize she only had 1 tablet of warfarin remaining in her bottle last evening so she only took \"5 mg\" warfarin instead of \"7.5 mg\" that was scheduled on her dosing calendar. She says there was \"1 other day\" she fell asleep and missed taking her warfarin, but she says it was \"not this past week\" but \"sometime the week before\". No change in other maintenance medications. Upon reviewing her diet, she says she did have a \"taco salad\" and a \"couple\" other salads this past week, but mostly \"iceberg\" lettuce. Estephania Boswell says she has been more stressed recently and is \"probably\" smoking \"a little more\" than she had been previously. She estimates that she's smoking \"at most, 5 cigarettes daily\" and had previously been smoking \"2 or 3\" daily. Since her INR is significantly more sub-therapeutic today, we will have her take 10 mg warfarin tonight and tomorrow night and then increase current weekly warfarin regimen (5%) as noted on her dosing calendar. We will recheck INR in 2 and 1/2 weeks and reassess further warfarin dosing at that time. Patient acknowledges working in consult agreement with pharmacist as referred by his/her physician.                   For Pharmacy Admin Tracking Only     Intervention Detail: Adherence Monitorin and Dose Adjustment: 1, reason: Therapy Optimization   Total # of Interventions Recommended: 2   Total # of Interventions Accepted: 2   Time Spent (min): [address] Shahida Meraz, [facility], PharmD

## 2021-10-12 ENCOUNTER — OFFICE VISIT (OUTPATIENT)
Dept: FAMILY MEDICINE CLINIC | Age: 41
End: 2021-10-12

## 2021-10-12 VITALS
HEIGHT: 67 IN | BODY MASS INDEX: 37.83 KG/M2 | DIASTOLIC BLOOD PRESSURE: 84 MMHG | RESPIRATION RATE: 18 BRPM | HEART RATE: 86 BPM | SYSTOLIC BLOOD PRESSURE: 122 MMHG | WEIGHT: 241 LBS | OXYGEN SATURATION: 98 %

## 2021-10-12 DIAGNOSIS — I82.5Z9 CHRONIC DEEP VEIN THROMBOSIS (DVT) OF LOWER LEG (HCC): ICD-10-CM

## 2021-10-12 DIAGNOSIS — I10 ESSENTIAL HYPERTENSION: Primary | ICD-10-CM

## 2021-10-12 DIAGNOSIS — D50.9 IRON DEFICIENCY ANEMIA, UNSPECIFIED IRON DEFICIENCY ANEMIA TYPE: ICD-10-CM

## 2021-10-12 PROCEDURE — 99213 OFFICE O/P EST LOW 20 MIN: CPT | Performed by: INTERNAL MEDICINE

## 2021-10-12 RX ORDER — FERROUS SULFATE 325(65) MG
325 TABLET ORAL 2 TIMES DAILY WITH MEALS
Qty: 60 TABLET | Refills: 3 | Status: SHIPPED | OUTPATIENT
Start: 2021-10-12 | End: 2022-02-28 | Stop reason: SDUPTHER

## 2021-10-12 RX ORDER — LOSARTAN POTASSIUM 100 MG/1
100 TABLET ORAL DAILY
Qty: 30 TABLET | Refills: 5 | Status: SHIPPED | OUTPATIENT
Start: 2021-10-12 | End: 2022-04-14 | Stop reason: SDUPTHER

## 2021-10-12 ASSESSMENT — ENCOUNTER SYMPTOMS
COUGH: 0
SORE THROAT: 0
SHORTNESS OF BREATH: 0
ABDOMINAL PAIN: 0
NAUSEA: 0

## 2021-10-12 NOTE — PROGRESS NOTES
HPI Notes    Name: Hank Heading  : 1980         Chief Complaint:     Chief Complaint   Patient presents with    Check-Up     No complaints. States \"everything is okay, wants to discuss being taken off blood thinner. \"        History of Present Illness:        Louann Pink presents to office to follow up for HTN and DVT    She developed  Extensive DVT right lower extremity on 21 postoperatively. She had a right intertrochanteric hip fracture and underwent repair on 2021  Patient has been on Coumadin for about 9 months.  she would like to stop it due to having heavy periods. Hypertension  This is a chronic problem. The current episode started more than 1 year ago. The problem is unchanged. The problem is controlled. Pertinent negatives include no chest pain, headaches, palpitations, peripheral edema or shortness of breath. There are no associated agents to hypertension. Risk factors for coronary artery disease include obesity, smoking/tobacco exposure and family history. Past treatments include angiotensin blockers. The current treatment provides significant improvement. There are no compliance problems. Past Medical History:     No past medical history on file. Reviewed all health maintenance requirements and orderedappropriate tests  Health Maintenance Due   Topic Date Due    Cervical cancer screen  Never done    Lipid screen  Never done       Past Surgical History:     Past Surgical History:   Procedure Laterality Date     SECTION      FEMUR FRACTURE SURGERY Right 2021    FEMUR IM NAIL PRESLEY INSERTION performed by Silvia Dunlap MD at [facility] [place]        Medications:       Prior to Admission medications    Medication Sig Start Date End Date Taking? Authorizing Provider   warfarin (COUMADIN) 5 MG tablet Take 1 tablet on Sundays and 1 and 1/2 tablets all other days of the week or as directed by [facility].  10/5/21  Yes Nathen Alexis MD phenylephrine-APAP-guaiFENesin ([place] [address]V LAUREN/PN) 5-325-200 MG TABS Take by mouth as needed   Yes Historical Provider, MD   acetaminophen (TYLENOL) 500 MG tablet Take 1,000 mg by mouth 2 times daily as needed for Pain   Yes Historical Provider, MD   ferrous sulfate (IRON 325) 325 (65 Fe) MG tablet Take 1 tablet by mouth 2 times daily (with meals) 7/8/21  Yes Madan Hurst MD   losartan (COZAAR) 100 MG tablet Take 1 tablet by mouth daily 7/8/21  Yes Madan Hurst MD        Allergies:       Patient has no known allergies. Social History:     Tobacco: reports that she quit smoking about 8 months ago. Her smoking use included cigarettes. She smoked 0.50 packs per day. She has never used smokeless tobacco.  Alcohol:      reports current alcohol use of about 2.0 standard drinks of alcohol per week. Drug Use:  reports no history of drug use. Family History:     No family history on file. Review of Systems:         Review of Systems   Constitutional: Negative for chills and fever. HENT: Negative for congestion and sore throat. Respiratory: Negative for cough and shortness of breath. Cardiovascular: Negative for chest pain and palpitations. Gastrointestinal: Negative for abdominal pain and nausea. Genitourinary: Negative for dysuria. Skin: Negative for rash. Neurological: Negative for dizziness and headaches. Psychiatric/Behavioral: The patient is not nervous/anxious. Physical Exam:     Vitals:  /84   Pulse 86   Resp 18   Ht 5' 7\" (1.702 m)   Wt 241 lb (109.3 kg)   SpO2 98%   BMI 37.75 kg/m²       Physical Exam  Vitals reviewed. Constitutional:       General: She is not in acute distress. Appearance: She is well-developed. HENT:      Head: Normocephalic and atraumatic. Right Ear: External ear normal.      Left Ear: External ear normal.   Neck:      Thyroid: No thyromegaly. Cardiovascular:      Rate and Rhythm: Normal rate and regular rhythm. Heart sounds: Normal heart sounds. No murmur heard. Pulmonary:      Effort: Pulmonary effort is normal.      Breath sounds: Normal breath sounds. No wheezing or rales. Abdominal:      General: Bowel sounds are normal. There is no distension. Palpations: Abdomen is soft. There is no mass. Tenderness: There is no abdominal tenderness. Musculoskeletal:         General: Normal range of motion. Lymphadenopathy:      Cervical: No cervical adenopathy. Skin:     General: Skin is warm and dry. Findings: No rash. Neurological:      Mental Status: She is alert and oriented to person, place, and time. Psychiatric:         Behavior: Behavior normal.         Judgment: Judgment normal.               Data:     Lab Results   Component Value Date     02/28/2021    K 3.7 02/28/2021     02/28/2021    CO2 25 02/28/2021    BUN 6 02/28/2021    CREATININE 0.61 02/28/2021    GLUCOSE 93 02/28/2021    PROT 7.3 02/26/2021    LABALBU 3.9 02/26/2021    BILITOT 0.20 02/26/2021    ALKPHOS 148 02/26/2021    AST 18 02/26/2021    ALT 13 02/26/2021     Lab Results   Component Value Date    WBC 6.0 03/01/2021    RBC 4.58 03/01/2021    HGB 10.9 03/01/2021    HCT 34.5 03/01/2021    MCV 75.3 03/01/2021    MCH 23.8 03/01/2021    MCHC 31.6 03/01/2021    RDW 24.5 03/01/2021     03/01/2021    MPV NOT REPORTED 03/01/2021     No results found for: TSH  No results found for: CHOL, HDL, PSA, LABA1C       Assessment & Plan        Diagnosis Orders   1. Essential hypertension   BP well controlled, continue on Losartan , follow up in 6 months     2. Chronic deep vein thrombosis (DVT) of lower leg (HCC)   She has a h/o provoked DVT. Has been on anticoagulation for about 9 months. Will stop Coumadin. Pt advised to call if develops pain or swelling in th leg    3.  Iron deficiency anemia, unspecified iron deficiency anemia type   Continue on iron sup[plement                     Completed Refills   Requested

## 2021-12-15 NOTE — PROGRESS NOTES
From: Abi Nova  To: Ninfa Jiménez  Sent: 12/15/2021 7:48 AM CST  Subject: Refill request     Good morning! I hope you’re doing well!   Im looking for a refill of my zolpidem 5mg tab.   Last refill 11/8/21  [facility]   Thank you so much!   Abi   
ZEV for signature.     Medication prepped      
term goal 3: Pt to amb without AD x200ft to restore community amb independence.- NOT MET  Long term goal 4: Pt to maintain SLS 10sec 2:3 trials on aeromat to improve outdoor amb safety.  -NOT MET     Post Treatment Pain:  2/10    Time In: 1435  Time Out: 1515  Timed Code Treatment Minutes: 40 Minutes  Total Treatment Time: Colby Chance PT     Date: 5/27/2021

## 2022-02-10 ENCOUNTER — TELEPHONE (OUTPATIENT)
Dept: FAMILY MEDICINE CLINIC | Age: 42
End: 2022-02-10

## 2022-02-28 RX ORDER — FERROUS SULFATE 325(65) MG
325 TABLET ORAL 2 TIMES DAILY WITH MEALS
Qty: 60 TABLET | Refills: 3 | Status: SHIPPED | OUTPATIENT
Start: 2022-02-28

## 2022-04-14 ENCOUNTER — OFFICE VISIT (OUTPATIENT)
Dept: FAMILY MEDICINE CLINIC | Age: 42
End: 2022-04-14

## 2022-04-14 VITALS
WEIGHT: 235 LBS | DIASTOLIC BLOOD PRESSURE: 80 MMHG | BODY MASS INDEX: 36.81 KG/M2 | RESPIRATION RATE: 18 BRPM | TEMPERATURE: 99.1 F | OXYGEN SATURATION: 98 % | SYSTOLIC BLOOD PRESSURE: 126 MMHG

## 2022-04-14 DIAGNOSIS — K80.12: ICD-10-CM

## 2022-04-14 DIAGNOSIS — I10 ESSENTIAL HYPERTENSION: Primary | ICD-10-CM

## 2022-04-14 DIAGNOSIS — D50.9 IRON DEFICIENCY ANEMIA, UNSPECIFIED IRON DEFICIENCY ANEMIA TYPE: ICD-10-CM

## 2022-04-14 DIAGNOSIS — Z13.220 LIPID SCREENING: ICD-10-CM

## 2022-04-14 PROCEDURE — 99214 OFFICE O/P EST MOD 30 MIN: CPT | Performed by: INTERNAL MEDICINE

## 2022-04-14 RX ORDER — LOSARTAN POTASSIUM 100 MG/1
100 TABLET ORAL DAILY
Qty: 30 TABLET | Refills: 5 | Status: SHIPPED | OUTPATIENT
Start: 2022-04-14 | End: 2022-10-04

## 2022-04-14 ASSESSMENT — ENCOUNTER SYMPTOMS
ABDOMINAL PAIN: 0
SORE THROAT: 0
COUGH: 0
SHORTNESS OF BREATH: 0
NAUSEA: 0

## 2022-04-14 ASSESSMENT — PATIENT HEALTH QUESTIONNAIRE - PHQ9
2. FEELING DOWN, DEPRESSED OR HOPELESS: 0
SUM OF ALL RESPONSES TO PHQ9 QUESTIONS 1 & 2: 0
SUM OF ALL RESPONSES TO PHQ QUESTIONS 1-9: 0
1. LITTLE INTEREST OR PLEASURE IN DOING THINGS: 0
SUM OF ALL RESPONSES TO PHQ QUESTIONS 1-9: 0

## 2022-04-14 NOTE — PROGRESS NOTES
HPI Notes    Name: Raciel Redd  : 1980         Chief Complaint:     Chief Complaint   Patient presents with    6 Month Follow-Up     patient states that she had a recent gallbladder attack, towards the start of the month.  Hypertension       History of Present Illness:        Daria Rush presents to office to follow up for HTN, Anemia    States went to [place] [facility] on 3/5/22 for severe abdominal pain     She had CT abd/pelvis revealing :    IMPRESSION:     Gallbladder filled with gallstones with mild wall thickening. Correlate with ultrasound and clinically for any right upper quadrant abdominal pain/cholecystitis. Prior gastric hernia repair with LINX device seen. Mild colonic diverticula. She had nl LFT, Lipase, CBC   Says  was told to follow up with PCP  Since then she had no abdominal pain, changed her diet to low fat. Has a h/o iron deficiency anemia and taking Iron supplement. She has been on it for about one year. Tolerates well  Reports no side effects. Last CBC 3/5/22 was normal       Hypertension  This is a chronic problem. The current episode started more than 1 year ago. The problem is unchanged. The problem is controlled. Pertinent negatives include no chest pain, headaches, palpitations or shortness of breath. There are no associated agents to hypertension. Risk factors for coronary artery disease include family history. Past treatments include angiotensin blockers. The current treatment provides significant improvement. There are no compliance problems. There is no history of kidney disease, CAD/MI, CVA or heart failure. Past Medical History:     No past medical history on file.    Reviewed all health maintenance requirements and orderedappropriate tests  Health Maintenance Due   Topic Date Due    Cervical cancer screen  Never done    Lipid screen  Never done    Potassium monitoring  2022    Creatinine monitoring  2022    Depression Screen  2022 Past Surgical History:     Past Surgical History:   Procedure Laterality Date     SECTION      FEMUR FRACTURE SURGERY Right 2021    FEMUR IM NAIL PRESLEY INSERTION performed by Alden Homans, MD at [facility] OR        Medications:       Prior to Admission medications    Medication Sig Start Date End Date Taking? Authorizing Provider   losartan (COZAAR) 100 MG tablet Take 1 tablet by mouth daily 22  Yes Yary Ingram MD   ferrous sulfate (IRON 325) 325 (65 Fe) MG tablet Take 1 tablet by mouth 2 times daily (with meals) 22  Yes Yary Ingram MD   acetaminophen (TYLENOL) 500 MG tablet Take 1,000 mg by mouth 2 times daily as needed for Pain   Yes Historical Provider, MD        Allergies:       Patient has no known allergies. Social History:     Tobacco: reports that she quit smoking about 14 months ago. Her smoking use included cigarettes. She smoked 0.50 packs per day. She has never used smokeless tobacco.  Alcohol:      reports current alcohol use of about 2.0 standard drinks of alcohol per week. Drug Use:  reports no history of drug use. Family History:     No family history on file. Review of Systems:         Review of Systems   Constitutional: Negative for activity change, appetite change, chills, fatigue and fever. HENT: Negative for congestion and sore throat. Respiratory: Negative for cough and shortness of breath. Cardiovascular: Negative for chest pain, palpitations and leg swelling. Gastrointestinal: Negative for abdominal pain and nausea. Genitourinary: Negative for dysuria. Skin: Negative for rash. Neurological: Negative for dizziness, facial asymmetry and headaches. Psychiatric/Behavioral: Negative for decreased concentration. The patient is not nervous/anxious. Physical Exam:     Vitals:  /80   Temp 99.1 °F (37.3 °C) (Temporal)   Resp 18   Wt 235 lb (106.6 kg)   SpO2 98%   BMI 36.81 kg/m²       Physical Exam  Vitals reviewed. Constitutional:       General: She is not in acute distress. Appearance: Normal appearance. She is well-developed. HENT:      Head: Normocephalic and atraumatic. Eyes:      General: No scleral icterus. Right eye: No discharge. Left eye: No discharge. Conjunctiva/sclera: Conjunctivae normal.   Neck:      Thyroid: No thyromegaly. Cardiovascular:      Rate and Rhythm: Normal rate and regular rhythm. Heart sounds: Normal heart sounds. No murmur heard. Pulmonary:      Effort: Pulmonary effort is normal.      Breath sounds: Normal breath sounds. No wheezing or rales. Abdominal:      General: Bowel sounds are normal. There is no distension. Palpations: Abdomen is soft. There is no mass. Tenderness: There is no abdominal tenderness. Musculoskeletal:         General: Normal range of motion. Right lower leg: No edema. Left lower leg: Edema present. Lymphadenopathy:      Cervical: No cervical adenopathy. Skin:     General: Skin is warm and dry. Coloration: Skin is not jaundiced or pale. Findings: No rash. Neurological:      General: No focal deficit present. Mental Status: She is alert and oriented to person, place, and time. Mental status is at baseline. Psychiatric:         Mood and Affect: Mood normal.         Behavior: Behavior normal.         Thought Content:  Thought content normal.               Data:     Lab Results   Component Value Date     02/28/2021    K 3.7 02/28/2021     02/28/2021    CO2 25 02/28/2021    BUN 6 02/28/2021    CREATININE 0.61 02/28/2021    GLUCOSE 93 02/28/2021    PROT 7.3 02/26/2021    LABALBU 3.9 02/26/2021    BILITOT 0.20 02/26/2021    ALKPHOS 148 02/26/2021    AST 18 02/26/2021    ALT 13 02/26/2021     Lab Results   Component Value Date    WBC 6.0 03/01/2021    RBC 4.58 03/01/2021    HGB 10.9 03/01/2021    HCT 34.5 03/01/2021    MCV 75.3 03/01/2021    MCH 23.8 03/01/2021    MCHC 31.6 03/01/2021 RDW 24.5 03/01/2021     03/01/2021    MPV NOT REPORTED 03/01/2021     No results found for: TSH  No results found for: CHOL, LDL, HDL, PSA, LABA1C       Assessment & Plan        Diagnosis Orders   1. Essential hypertension   Blood pressure controlled, continue on Losartan   Basic Metabolic Panel    losartan (COZAAR) 100 MG tablet   2. Iron deficiency anemia, unspecified iron deficiency anemia type   Improved, continue on Iron supplement     3. Lipid screening   Check Lipid panel  Lipid Panel   4. Calculus of GB w acute and chronic cholecyst w/o obstruction   Will check RUQ U/S. May need to be referred to general surgery  US ABDOMEN LIMITED                   Completed Refills   Requested Prescriptions     Signed Prescriptions Disp Refills    losartan (COZAAR) 100 MG tablet 30 tablet 5     Sig: Take 1 tablet by mouth daily     No follow-ups on file. Orders Placed This Encounter   Medications    losartan (COZAAR) 100 MG tablet     Sig: Take 1 tablet by mouth daily     Dispense:  30 tablet     Refill:  5     Orders Placed This Encounter   Procedures    US ABDOMEN LIMITED     This procedure can be scheduled via [facility]. Access your [facility] account by visiting [email or web address]. Standing Status:   Future     Standing Expiration Date:   4/14/2023    Basic Metabolic Panel     Standing Status:   Future     Standing Expiration Date:   4/14/2023    Lipid Panel     Standing Status:   Future     Standing Expiration Date:   4/14/2023     Order Specific Question:   Is Patient Fasting?/# of Hours     Answer:   yes         Patient Instructions     SURVEY:    You may be receiving a survey from [employer] regarding your visit today. Please complete the survey to enable us to provide the highest quality of care to you and your family. If you cannot score us a very good on any question, please call the office to discuss how we could have made your experience a very good one. Thank you.   [facility]  Roger Briceno MD   Jerod Staff, MD Rhea Salgado, MD Malik, MD Linsey Burger, MD Rosa Cardoso, Parul Caba RUST, MA  [facility] [place] [facility], [place], [place]  Oren Paul, THUY  [place], [place]        Electronically signed by Roger Briceno MD on 4/14/2022 at 12:48 PM           Completed Refills      Requested Prescriptions     Signed Prescriptions Disp Refills    losartan (COZAAR) 100 MG tablet 30 tablet 5     Sig: Take 1 tablet by mouth daily

## 2022-04-14 NOTE — PATIENT INSTRUCTIONS
SURVEY:    You may be receiving a survey from [employer] regarding your visit today. Please complete the survey to enable us to provide the highest quality of care to you and your family. If you cannot score us a very good on any question, please call the office to discuss how we could have made your experience a very good one. Thank you.   [address] [place] [address]  MD Shawn Poewrs MD Gena Chimera, MD Eugenie Amas, MD Christianne Chapel, MD Isabelle Pour, AuD [place], [facility] [place] [facility], [address] [place] [address]  Renny Del Rio [place], [place]

## 2022-04-28 ENCOUNTER — HOSPITAL ENCOUNTER (OUTPATIENT)
Age: 42
Discharge: HOME | End: 2022-04-28
Payer: COMMERCIAL

## 2022-04-28 ENCOUNTER — HOSPITAL ENCOUNTER (OUTPATIENT)
Dept: ULTRASOUND IMAGING | Age: 42
Discharge: HOME | End: 2022-04-30
Payer: COMMERCIAL

## 2022-04-28 DIAGNOSIS — I10 ESSENTIAL HYPERTENSION: ICD-10-CM

## 2022-04-28 DIAGNOSIS — Z13.220 LIPID SCREENING: ICD-10-CM

## 2022-04-28 DIAGNOSIS — K80.12: ICD-10-CM

## 2022-04-28 LAB
ANION GAP SERPL CALCULATED.3IONS-SCNC: 10 MMOL/L (ref 9–17)
BUN BLDV-MCNC: 9 MG/DL (ref 6–20)
BUN/CREAT BLD: 15 (ref 9–20)
CALCIUM SERPL-MCNC: 9.1 MG/DL (ref 8.6–10.4)
CHLORIDE BLD-SCNC: 104 MMOL/L (ref 98–107)
CHOLESTEROL/HDL RATIO: 3.6
CHOLESTEROL: 254 MG/DL
CO2: 28 MMOL/L (ref 20–31)
CREAT SERPL-MCNC: 0.62 MG/DL (ref 0.5–0.9)
GFR AFRICAN AMERICAN: >60 ML/MIN
GFR NON-AFRICAN AMERICAN: >60 ML/MIN
GFR SERPL CREATININE-BSD FRML MDRD: ABNORMAL ML/MIN/{1.73_M2}
GLUCOSE BLD-MCNC: 100 MG/DL (ref 70–99)
HDLC SERPL-MCNC: 71 MG/DL
LDL CHOLESTEROL: 144 MG/DL (ref 0–130)
PATIENT FASTING?: YES
POTASSIUM SERPL-SCNC: 4.6 MMOL/L (ref 3.7–5.3)
SODIUM BLD-SCNC: 142 MMOL/L (ref 135–144)
TRIGL SERPL-MCNC: 196 MG/DL

## 2022-04-28 PROCEDURE — 80061 LIPID PANEL: CPT

## 2022-04-28 PROCEDURE — 76705 ECHO EXAM OF ABDOMEN: CPT

## 2022-04-28 PROCEDURE — 36415 COLL VENOUS BLD VENIPUNCTURE: CPT

## 2022-04-28 PROCEDURE — 80048 BASIC METABOLIC PNL TOTAL CA: CPT

## 2022-05-03 ENCOUNTER — TELEPHONE (OUTPATIENT)
Dept: FAMILY MEDICINE CLINIC | Age: 42
End: 2022-05-03

## 2022-05-03 NOTE — TELEPHONE ENCOUNTER
----- Message from Lydia Garcia MD sent at 4/28/2022  6:13 PM EDT -----  Labs ok except cholesterol borderline elevated  Recommend low fat diet  Thanks

## 2022-10-04 DIAGNOSIS — I10 ESSENTIAL HYPERTENSION: ICD-10-CM

## 2022-10-04 RX ORDER — LOSARTAN POTASSIUM 100 MG/1
100 TABLET ORAL DAILY
Qty: 30 TABLET | Refills: 5 | Status: SHIPPED | OUTPATIENT
Start: 2022-10-04

## 2023-04-11 PROBLEM — F10.920 ACUTE ALCOHOLIC INTOXICATION WITHOUT COMPLICATION (HCC): Status: ACTIVE | Noted: 2023-04-11

## 2023-04-11 PROBLEM — E66.01 SEVERE OBESITY (BMI 35.0-39.9) WITH COMORBIDITY (HCC): Status: ACTIVE | Noted: 2023-04-11

## 2023-05-02 ENCOUNTER — HOSPITAL ENCOUNTER (OUTPATIENT)
Age: 43
Discharge: HOME | End: 2023-05-02
Payer: COMMERCIAL

## 2023-05-02 DIAGNOSIS — I10 ESSENTIAL HYPERTENSION: ICD-10-CM

## 2023-05-02 DIAGNOSIS — D50.9 IRON DEFICIENCY ANEMIA, UNSPECIFIED IRON DEFICIENCY ANEMIA TYPE: ICD-10-CM

## 2023-05-02 DIAGNOSIS — E78.5 HYPERLIPIDEMIA, UNSPECIFIED HYPERLIPIDEMIA TYPE: ICD-10-CM

## 2023-05-02 LAB
ABSOLUTE EOS #: 0.1 K/UL (ref 0–0.4)
ABSOLUTE LYMPH #: 1.9 K/UL (ref 1–4.8)
ABSOLUTE MONO #: 0.4 K/UL (ref 0–1)
ANION GAP SERPL CALCULATED.3IONS-SCNC: 10 MMOL/L (ref 9–17)
BASOPHILS # BLD: 0 % (ref 0–2)
BASOPHILS ABSOLUTE: 0 K/UL (ref 0–0.2)
BUN SERPL-MCNC: 10 MG/DL (ref 6–20)
BUN/CREAT BLD: 20 (ref 9–20)
CALCIUM SERPL-MCNC: 9.1 MG/DL (ref 8.6–10.4)
CHLORIDE SERPL-SCNC: 101 MMOL/L (ref 98–107)
CHOLEST SERPL-MCNC: 233 MG/DL
CHOLESTEROL/HDL RATIO: 3.6
CO2 SERPL-SCNC: 25 MMOL/L (ref 20–31)
CREAT SERPL-MCNC: 0.51 MG/DL (ref 0.5–0.9)
DIFFERENTIAL TYPE: YES
EOSINOPHILS RELATIVE PERCENT: 2 % (ref 0–5)
GFR SERPL CREATININE-BSD FRML MDRD: >60 ML/MIN/1.73M2
GLUCOSE SERPL-MCNC: 114 MG/DL (ref 70–99)
HCT VFR BLD AUTO: 40.1 % (ref 36–46)
HDLC SERPL-MCNC: 65 MG/DL
HGB BLD-MCNC: 13.8 G/DL (ref 12–16)
IRON SATURATION: 45 % (ref 20–55)
IRON SERPL-MCNC: 134 UG/DL (ref 37–145)
LDLC SERPL CALC-MCNC: 122 MG/DL (ref 0–130)
LYMPHOCYTES # BLD: 34 % (ref 15–40)
MCH RBC QN AUTO: 30.4 PG (ref 26–34)
MCHC RBC AUTO-ENTMCNC: 34.4 G/DL (ref 31–37)
MCV RBC AUTO: 88.4 FL (ref 80–100)
MONOCYTES # BLD: 8 % (ref 4–8)
PATIENT FASTING?: YES
PDW BLD-RTO: 12.3 % (ref 12.1–15.2)
PLATELET # BLD AUTO: 169 K/UL (ref 140–450)
POTASSIUM SERPL-SCNC: 4.1 MMOL/L (ref 3.7–5.3)
RBC # BLD: 4.54 M/UL (ref 4–5.2)
SEG NEUTROPHILS: 56 % (ref 47–75)
SEGMENTED NEUTROPHILS ABSOLUTE COUNT: 3.2 K/UL (ref 2.5–7)
SODIUM SERPL-SCNC: 136 MMOL/L (ref 135–144)
TIBC SERPL-MCNC: 295 UG/DL (ref 250–450)
TRIGL SERPL-MCNC: 229 MG/DL
TSH SERPL-ACNC: 1.37 UIU/ML (ref 0.3–5)
UNSATURATED IRON BINDING CAPACITY: 161 UG/DL (ref 112–347)
WBC # BLD AUTO: 5.8 K/UL (ref 3.5–11)

## 2023-05-02 PROCEDURE — 85025 COMPLETE CBC W/AUTO DIFF WBC: CPT

## 2023-05-02 PROCEDURE — 80048 BASIC METABOLIC PNL TOTAL CA: CPT

## 2023-05-02 PROCEDURE — 83540 ASSAY OF IRON: CPT

## 2023-05-02 PROCEDURE — 80061 LIPID PANEL: CPT

## 2023-05-02 PROCEDURE — 83550 IRON BINDING TEST: CPT

## 2023-05-02 PROCEDURE — 84443 ASSAY THYROID STIM HORMONE: CPT

## 2023-05-02 PROCEDURE — 36415 COLL VENOUS BLD VENIPUNCTURE: CPT

## 2023-10-02 DIAGNOSIS — I10 ESSENTIAL HYPERTENSION: ICD-10-CM

## 2023-10-02 RX ORDER — LOSARTAN POTASSIUM 100 MG/1
100 TABLET ORAL DAILY
Qty: 90 TABLET | Refills: 1 | Status: SHIPPED | OUTPATIENT
Start: 2023-10-02

## 2024-03-20 DIAGNOSIS — I10 ESSENTIAL HYPERTENSION: ICD-10-CM

## 2024-03-20 RX ORDER — LOSARTAN POTASSIUM 100 MG/1
100 TABLET ORAL DAILY
Qty: 90 TABLET | Refills: 1 | Status: SHIPPED | OUTPATIENT
Start: 2024-03-20

## 2024-04-11 ENCOUNTER — OFFICE VISIT (OUTPATIENT)
Dept: FAMILY MEDICINE CLINIC | Age: 44
End: 2024-04-11

## 2024-04-11 VITALS
OXYGEN SATURATION: 94 % | SYSTOLIC BLOOD PRESSURE: 128 MMHG | DIASTOLIC BLOOD PRESSURE: 84 MMHG | RESPIRATION RATE: 18 BRPM | HEIGHT: 67 IN | BODY MASS INDEX: 37.98 KG/M2 | HEART RATE: 89 BPM | WEIGHT: 242 LBS

## 2024-04-11 DIAGNOSIS — I10 ESSENTIAL HYPERTENSION: Primary | ICD-10-CM

## 2024-04-11 DIAGNOSIS — Z12.31 BREAST CANCER SCREENING BY MAMMOGRAM: ICD-10-CM

## 2024-04-11 DIAGNOSIS — E66.01 SEVERE OBESITY (BMI 35.0-39.9) WITH COMORBIDITY (HCC): ICD-10-CM

## 2024-04-11 DIAGNOSIS — F10.920 ACUTE ALCOHOLIC INTOXICATION WITHOUT COMPLICATION (HCC): ICD-10-CM

## 2024-04-11 DIAGNOSIS — Z12.11 COLON CANCER SCREENING: ICD-10-CM

## 2024-04-11 DIAGNOSIS — D50.9 IRON DEFICIENCY ANEMIA, UNSPECIFIED IRON DEFICIENCY ANEMIA TYPE: ICD-10-CM

## 2024-04-11 DIAGNOSIS — N92.1 MENORRHAGIA WITH IRREGULAR CYCLE: ICD-10-CM

## 2024-04-11 DIAGNOSIS — I82.5Z9 CHRONIC DEEP VEIN THROMBOSIS (DVT) OF LOWER LEG (HCC): ICD-10-CM

## 2024-04-11 RX ORDER — HYDROCHLOROTHIAZIDE 25 MG/1
25 TABLET ORAL DAILY
Qty: 30 TABLET | Refills: 5 | Status: SHIPPED | OUTPATIENT
Start: 2024-04-11

## 2024-04-11 RX ORDER — FERROUS SULFATE 325(65) MG
325 TABLET ORAL 2 TIMES DAILY WITH MEALS
Qty: 60 TABLET | Refills: 3 | Status: SHIPPED | OUTPATIENT
Start: 2024-04-11

## 2024-04-11 RX ORDER — HYDROCHLOROTHIAZIDE 25 MG/1
25 TABLET ORAL DAILY
COMMUNITY
Start: 2024-04-04 | End: 2024-04-11 | Stop reason: SDUPTHER

## 2024-04-11 SDOH — ECONOMIC STABILITY: INCOME INSECURITY: HOW HARD IS IT FOR YOU TO PAY FOR THE VERY BASICS LIKE FOOD, HOUSING, MEDICAL CARE, AND HEATING?: NOT VERY HARD

## 2024-04-11 SDOH — ECONOMIC STABILITY: FOOD INSECURITY: WITHIN THE PAST 12 MONTHS, YOU WORRIED THAT YOUR FOOD WOULD RUN OUT BEFORE YOU GOT MONEY TO BUY MORE.: NEVER TRUE

## 2024-04-11 SDOH — ECONOMIC STABILITY: HOUSING INSECURITY
IN THE LAST 12 MONTHS, WAS THERE A TIME WHEN YOU DID NOT HAVE A STEADY PLACE TO SLEEP OR SLEPT IN A SHELTER (INCLUDING NOW)?: NO

## 2024-04-11 SDOH — ECONOMIC STABILITY: FOOD INSECURITY: WITHIN THE PAST 12 MONTHS, THE FOOD YOU BOUGHT JUST DIDN'T LAST AND YOU DIDN'T HAVE MONEY TO GET MORE.: NEVER TRUE

## 2024-04-11 SDOH — ECONOMIC STABILITY: TRANSPORTATION INSECURITY
IN THE PAST 12 MONTHS, HAS LACK OF TRANSPORTATION KEPT YOU FROM MEETINGS, WORK, OR FROM GETTING THINGS NEEDED FOR DAILY LIVING?: NO

## 2024-04-11 ASSESSMENT — PATIENT HEALTH QUESTIONNAIRE - PHQ9
SUM OF ALL RESPONSES TO PHQ9 QUESTIONS 1 & 2: 0
2. FEELING DOWN, DEPRESSED OR HOPELESS: NOT AT ALL
SUM OF ALL RESPONSES TO PHQ QUESTIONS 1-9: 0
1. LITTLE INTEREST OR PLEASURE IN DOING THINGS: NOT AT ALL
1. LITTLE INTEREST OR PLEASURE IN DOING THINGS: NOT AT ALL
SUM OF ALL RESPONSES TO PHQ9 QUESTIONS 1 & 2: 0
2. FEELING DOWN, DEPRESSED OR HOPELESS: NOT AT ALL
SUM OF ALL RESPONSES TO PHQ QUESTIONS 1-9: 0

## 2024-04-11 ASSESSMENT — ENCOUNTER SYMPTOMS
NAUSEA: 0
SHORTNESS OF BREATH: 0
CHEST TIGHTNESS: 0
ABDOMINAL PAIN: 0
COUGH: 0
SORE THROAT: 0

## 2024-04-11 NOTE — PATIENT INSTRUCTIONS
SURVEY:    You may be receiving a survey from [facility] regarding your visit today.    Please complete the survey to enable us to provide the highest quality of care to you and your family.    If you cannot score us a very good on any question, please call the office to discuss how we could have made your experience a very good one.    Thank you.  [facility]  MD Gracie Salgado, MD Carl Miller, DO  Josse Hart, MD Tara Mao, APRN-CNP  Neena Betancourt, Practice Manager  Laura, CMA  Ngoc, CCMA  Nishant, CMA  Jyoti, CMA  Jed, PSC   Rosalva, LPN     Dear valued patient,    We hope this message finds you in good health. At [facility], we are committed to providing you with the best possible healthcare experience. To further enhance your convenience and streamline your access to our services, we would like to introduce you to the benefits of utilizing direct scheduling through the [employer] Patient Portal.    Direct scheduling is a feature within the [employer] Patient Portal that allows you to schedule appointments with your healthcare provider directly, without the need to make a phone call or wait for a callback. We understand that your time is maria a, and we want to ensure that you have quick and easy access to our services whenever you need them.  Here are some reasons why you should consider utilizing direct scheduling through the [employer] Patient Portal:    1. Convenience: With direct scheduling, you can book appointments at any time that suits you best, 24 hours a day, 7 days a week. No more waiting on hold or playing phone tag with our office staff. It puts you in control of managing your healthcare appointments effortlessly.    2. Accessibility: The [employer] Patient Portal is accessible through your computer, smartphone, or tablet, allowing you to schedule appointments from the comfort of your home, office, or on the go. You can access your medical

## 2024-04-11 NOTE — PROGRESS NOTES
vaccine (1 of 3 - 3-dose series) Never done    HIV screen  Never done    Hepatitis C screen  Never done    DTaP/Tdap/Td vaccine (1 - Tdap) Never done    Cervical cancer screen  Never done    Diabetes screen  Never done    COVID-19 Vaccine (3 - 2023-24 season) 2023    Colorectal Cancer Screen  Never done       Past Surgical History:     Past Surgical History:   Procedure Laterality Date     SECTION      FEMUR FRACTURE SURGERY Right 2021    FEMUR IM NAIL PRESLEY INSERTION performed by Jean Padilla MD at [facility] OR        Medications:       Prior to Admission medications    Medication Sig Start Date End Date Taking? Authorizing Provider   hydroCHLOROthiazide (HYDRODIURIL) 25 MG tablet Take 1 tablet by mouth daily 24  Yes Junior Rangel MD   ferrous sulfate (IRON 325) 325 (65 Fe) MG tablet Take 1 tablet by mouth 2 times daily (with meals) 24  Yes Junior Rangel MD   losartan (COZAAR) 100 MG tablet TAKE 1 TABLET BY MOUTH DAILY 3/20/24  Yes Junior Rangel MD   acetaminophen (TYLENOL) 500 MG tablet Take 2 tablets by mouth 2 times daily as needed for Pain   Yes Provider, MD Marlene        Allergies:       Patient has no known allergies.    Social History:     Tobacco: reports that she quit smoking about 3 years ago. Her smoking use included cigarettes. She has never used smokeless tobacco.  Alcohol:      reports current alcohol use of about 2.0 standard drinks of alcohol per week.  Drug Use:  reports no history of drug use.    Family History:     No family history on file.    Review of Systems:         Review of Systems   Constitutional:  Negative for activity change, appetite change, chills and fever.   HENT:  Negative for congestion and sore throat.    Respiratory:  Negative for cough, chest tightness and shortness of breath.    Cardiovascular:  Negative for chest pain and palpitations.   Gastrointestinal:  Negative for abdominal pain and nausea.   Genitourinary:  Negative for

## 2024-05-15 ENCOUNTER — TELEPHONE (OUTPATIENT)
Dept: FAMILY MEDICINE CLINIC | Age: 44
End: 2024-05-15

## 2024-05-15 NOTE — TELEPHONE ENCOUNTER
Called patient multiple attempts check the status of referral sent, unable to make contact. Left several voicemails   [Medication and Allergies Reconciled] : medication and allergies reconciled [High Risk Medications Reviewed and Reconciled (Beers Criteria)] : high risk medications reviewed and reconciled [Reviewed patient reported medication adherence from Comprehensive Assessment] : Reviewed patient reported medication adherence from comprehensive assessment [Adherent to medications] : Patient is adherent to medications as prescribed

## 2024-06-21 NOTE — ED NOTES
NO-SHOW LETTER SENT VIA MY CHART TO Belem Linares.    
Writer placed an indwelling urinary catheter with Jessie SOUZA(R) at patient bedside.  Pt tolerated well     Oliver Hooper RN  01/17/21 [id number]
No respiratory distress. No stridor, Lungs sounds clear with good aeration bilaterally.

## 2024-09-05 DIAGNOSIS — I10 ESSENTIAL HYPERTENSION: ICD-10-CM

## 2024-09-05 RX ORDER — LOSARTAN POTASSIUM 100 MG/1
100 TABLET ORAL DAILY
Qty: 90 TABLET | Refills: 1 | Status: SHIPPED | OUTPATIENT
Start: 2024-09-05

## 2024-10-12 DIAGNOSIS — I10 ESSENTIAL HYPERTENSION: ICD-10-CM

## 2024-10-14 RX ORDER — HYDROCHLOROTHIAZIDE 25 MG/1
25 TABLET ORAL DAILY
Qty: 30 TABLET | Refills: 5 | Status: SHIPPED | OUTPATIENT
Start: 2024-10-14

## 2025-01-29 ENCOUNTER — TELEPHONE (OUTPATIENT)
Dept: FAMILY MEDICINE CLINIC | Age: 45
End: 2025-01-29

## 2025-02-26 DIAGNOSIS — I10 ESSENTIAL HYPERTENSION: ICD-10-CM

## 2025-02-27 RX ORDER — LOSARTAN POTASSIUM 100 MG/1
100 TABLET ORAL DAILY
Qty: 90 TABLET | Refills: 1 | Status: SHIPPED | OUTPATIENT
Start: 2025-02-27

## 2025-04-28 ENCOUNTER — OFFICE VISIT (OUTPATIENT)
Dept: FAMILY MEDICINE CLINIC | Age: 45
End: 2025-04-28
Payer: COMMERCIAL

## 2025-04-28 VITALS
WEIGHT: 255.2 LBS | OXYGEN SATURATION: 94 % | RESPIRATION RATE: 18 BRPM | HEIGHT: 67 IN | BODY MASS INDEX: 40.06 KG/M2 | DIASTOLIC BLOOD PRESSURE: 88 MMHG | SYSTOLIC BLOOD PRESSURE: 128 MMHG | HEART RATE: 59 BPM

## 2025-04-28 DIAGNOSIS — D50.9 IRON DEFICIENCY ANEMIA, UNSPECIFIED IRON DEFICIENCY ANEMIA TYPE: ICD-10-CM

## 2025-04-28 DIAGNOSIS — E78.5 HYPERLIPIDEMIA, UNSPECIFIED HYPERLIPIDEMIA TYPE: ICD-10-CM

## 2025-04-28 DIAGNOSIS — Z12.31 BREAST CANCER SCREENING BY MAMMOGRAM: ICD-10-CM

## 2025-04-28 DIAGNOSIS — I10 ESSENTIAL HYPERTENSION: Primary | ICD-10-CM

## 2025-04-28 PROCEDURE — 3079F DIAST BP 80-89 MM HG: CPT | Performed by: INTERNAL MEDICINE

## 2025-04-28 PROCEDURE — 99214 OFFICE O/P EST MOD 30 MIN: CPT | Performed by: INTERNAL MEDICINE

## 2025-04-28 PROCEDURE — 3074F SYST BP LT 130 MM HG: CPT | Performed by: INTERNAL MEDICINE

## 2025-04-28 RX ORDER — HYDROCHLOROTHIAZIDE 25 MG/1
25 TABLET ORAL DAILY
Qty: 90 TABLET | Refills: 1 | Status: SHIPPED | OUTPATIENT
Start: 2025-04-28

## 2025-04-28 RX ORDER — HYDROCHLOROTHIAZIDE 25 MG/1
25 TABLET ORAL DAILY
Qty: 30 TABLET | Refills: 5 | Status: SHIPPED | OUTPATIENT
Start: 2025-04-28 | End: 2025-04-28

## 2025-04-28 SDOH — ECONOMIC STABILITY: FOOD INSECURITY: WITHIN THE PAST 12 MONTHS, THE FOOD YOU BOUGHT JUST DIDN'T LAST AND YOU DIDN'T HAVE MONEY TO GET MORE.: NEVER TRUE

## 2025-04-28 SDOH — ECONOMIC STABILITY: FOOD INSECURITY: WITHIN THE PAST 12 MONTHS, YOU WORRIED THAT YOUR FOOD WOULD RUN OUT BEFORE YOU GOT MONEY TO BUY MORE.: NEVER TRUE

## 2025-04-28 ASSESSMENT — ENCOUNTER SYMPTOMS
NAUSEA: 0
CHEST TIGHTNESS: 0
SORE THROAT: 0
SHORTNESS OF BREATH: 0
ABDOMINAL PAIN: 0
BLURRED VISION: 0
COUGH: 0

## 2025-04-28 ASSESSMENT — PATIENT HEALTH QUESTIONNAIRE - PHQ9
2. FEELING DOWN, DEPRESSED OR HOPELESS: NOT AT ALL
1. LITTLE INTEREST OR PLEASURE IN DOING THINGS: NOT AT ALL
SUM OF ALL RESPONSES TO PHQ QUESTIONS 1-9: 0

## 2025-04-28 NOTE — PATIENT INSTRUCTIONS
SURVEY:    You may be receiving a survey from [facility] regarding your visit today.    Please complete the survey to enable us to provide the highest quality of care to you and your family.    If you cannot score us a very good on any question, please call the office to discuss how we could have made your experience a very good one.    Thank you.    [facility]  MD Carl Salgado, DO Aline Engel, CNP  Josse Hart, MD Tara Mao, APRN-CNP  Neena Betancourt, Practice Manager  Natasha, DANITA Grossman, CCMDANIEL Dillard, CMA  Jyoti, CMA  Jed, PSC   Rosalva, LPN  Mattie, CMA

## 2025-04-28 NOTE — PROGRESS NOTES
HPI Notes    Name: Geoff Olsen  : 1980         Chief Complaint:     Chief Complaint   Patient presents with    Annual Exam    Hypertension       History of Present Illness:        Geoff presents to office to follow up for HTN, Hyperlipidemia and anemia    Has a h/o iron deficiency anemia and takes iron supplement OTC.   Was referred to gen. Surgeon Dr. Peña for colonoscopy but did not follow up . She is asking for another referral       Hypertension  This is a chronic problem. The current episode started more than 1 year ago. The problem is unchanged. The problem is controlled. Pertinent negatives include no anxiety, blurred vision, chest pain, headaches, palpitations, peripheral edema or shortness of breath. Risk factors for coronary artery disease include obesity, dyslipidemia and family history. Past treatments include diuretics, angiotensin blockers and lifestyle changes. The current treatment provides significant improvement. There are no compliance problems.  There is no history of kidney disease, CAD/MI, CVA or heart failure.   Hyperlipidemia  This is a chronic problem. The current episode started more than 1 year ago. The problem is controlled. Recent lipid tests were reviewed and are variable. Exacerbating diseases include obesity. Pertinent negatives include no chest pain or shortness of breath. Compliance problems include adherence to diet.            Past Medical History:     No past medical history on file.   Reviewed all health maintenance requirements and orderedappropriate tests  Health Maintenance Due   Topic Date Due    HIV screen  Never done    Hepatitis C screen  Never done    Hepatitis B vaccine (1 of 3 - 19+ 3-dose series) Never done    DTaP/Tdap/Td vaccine (1 - Tdap) Never done    Cervical cancer screen  Never done    Diabetes screen  Never done    Breast cancer screen  Never done    Colorectal Cancer Screen  Never done    COVID-19 Vaccine (3 - 2024- season) 2024

## 2025-05-05 ENCOUNTER — TELEPHONE (OUTPATIENT)
Dept: FAMILY MEDICINE CLINIC | Age: 45
End: 2025-05-05

## 2025-05-05 NOTE — TELEPHONE ENCOUNTER
[facility] contacted office in regards of receiving last Office notes but not receiving referral or demographics attached to notes, please advise.

## 2025-08-18 DIAGNOSIS — I10 ESSENTIAL HYPERTENSION: ICD-10-CM

## 2025-08-19 RX ORDER — LOSARTAN POTASSIUM 100 MG/1
100 TABLET ORAL DAILY
Qty: 90 TABLET | Refills: 1 | Status: SHIPPED | OUTPATIENT
Start: 2025-08-19

## (undated) DEVICE — NEEDLE HYPO 22GA L1.5IN BLK S STL HUB POLYPR SHLD REG BVL

## (undated) DEVICE — GLOVE SURG SZ 75 CRM LTX FREE POLYISOPRENE POLYMER BEAD ANTI

## (undated) DEVICE — GOWN,SIRUS,POLYRNF,BRTHSLV,XL,30/CS: Brand: MEDLINE

## (undated) DEVICE — SYRINGE BULB 50/CS 48/PLT: Brand: MEDEGEN MEDICAL PRODUCTS, LLC

## (undated) DEVICE — CUSTOM PACK: Brand: UNBRANDED

## (undated) DEVICE — COVER,MAYO STAND,STERILE: Brand: MEDLINE

## (undated) DEVICE — PEN: MARKING STD 100/CS: Brand: MEDICAL ACTION INDUSTRIES

## (undated) DEVICE — Z DISCONTINUED PER MEDLINE USE 2741942 DRESSING AQUACEL 6 IN ALG W9XL15CM SIL CVR WTRPRF VIR BACT BARR ANTIMIC

## (undated) DEVICE — DRESSING FOAM 4X8IN DISP POSTOP MEPILEX BORD AG

## (undated) DEVICE — ELECTRODE ES AD CRD L15FT DISP FOR PT BELOW 30LB REM

## (undated) DEVICE — SPONGE LAP W18XL18IN WHT COT 4 PLY FLD STRUNG RADPQ DISP ST

## (undated) DEVICE — YANKAUER,BULB TIP,ON/OFF CONTROL,STERILE: Brand: MEDLINE

## (undated) DEVICE — Z DISCONTINUED PER MEDLINE USE 2741943 DRESSING AQUACEL 10 IN ALG W9XL25CM SIL CVR WTRPRF VIR BACT BARR ANTIMIC

## (undated) DEVICE — C-ARMOR C-ARM EQUIPMENT COVERS CLEAR STERILE UNIVERSAL FIT 12 PER CASE: Brand: C-ARMOR

## (undated) DEVICE — GUIDEWIRE ORTH L400MM DIA3.2MM FOR TFN

## (undated) DEVICE — Device

## (undated) DEVICE — PENCIL SMOKE EVAC PUSH BUTTON COATED

## (undated) DEVICE — BIT DRL L330MM DIA4.2MM CALIB 100MM 3 FLUT QUIK CPL

## (undated) DEVICE — STAPLER EXT SKIN 35 WIDE S STL STPL SQUEEZE HNDL VISISTAT

## (undated) DEVICE — 4-PORT MANIFOLD: Brand: NEPTUNE 2

## (undated) DEVICE — COUNTER NDL MAG BLADE REMOVER ADH TAB 20 COUNT FOAM STRL

## (undated) DEVICE — SUTURE VCRL SZ 2-0 L27IN ABSRB UD L26MM SH 1/2 CIR J417H

## (undated) DEVICE — TOWEL,OR,DSP,ST,BLUE,STD,4/PK,20PK/CS: Brand: MEDLINE

## (undated) DEVICE — SUTURE ETHBND EXCEL SZ 0 L30IN NONABSORBABLE GRN L26MM CT-2 X412H

## (undated) DEVICE — PADDING UNDERCAST W4INXL4YD COT FBR LO LINTING WYTEX

## (undated) DEVICE — 3M™ STERI-DRAPE™ U-DRAPE 1015: Brand: STERI-DRAPE™

## (undated) DEVICE — BANDAGE ELASTIC COBAN COHESIVE 4INX5YD LF

## (undated) DEVICE — CHLORAPREP 26ML ORANGE

## (undated) DEVICE — 3M™ IOBAN™ 2 ANTIMICROBIAL INCISE DRAPE 6650EZ: Brand: IOBAN™ 2

## (undated) DEVICE — SHEET, DRAPE, SPLIT, STERILE: Brand: MEDLINE

## (undated) DEVICE — MEDI-VAC NON-CONDUCTIVE SUCTION TUBING 6MM X 6.1M (20 FT.) L: Brand: CARDINAL HEALTH

## (undated) DEVICE — BLADE SURG NO10 C STL STR DISP GLASSVAN

## (undated) DEVICE — THREE-QUARTER SHEET: Brand: CONVERTORS

## (undated) DEVICE — SOLUTION IV IRRIG POUR BRL 0.9% SODIUM CHL 2F7124